# Patient Record
Sex: FEMALE | Race: WHITE | NOT HISPANIC OR LATINO | URBAN - METROPOLITAN AREA
[De-identification: names, ages, dates, MRNs, and addresses within clinical notes are randomized per-mention and may not be internally consistent; named-entity substitution may affect disease eponyms.]

---

## 2017-11-14 ENCOUNTER — OUTPATIENT (OUTPATIENT)
Dept: OUTPATIENT SERVICES | Facility: HOSPITAL | Age: 82
LOS: 1 days | Discharge: HOME | End: 2017-11-14
Payer: MEDICARE

## 2017-11-14 DIAGNOSIS — S42.201A UNSPECIFIED FRACTURE OF UPPER END OF RIGHT HUMERUS, INITIAL ENCOUNTER FOR CLOSED FRACTURE: ICD-10-CM

## 2017-11-14 PROCEDURE — 73030 X-RAY EXAM OF SHOULDER: CPT | Mod: 26,LT

## 2017-11-15 ENCOUNTER — OUTPATIENT (OUTPATIENT)
Dept: OUTPATIENT SERVICES | Facility: HOSPITAL | Age: 82
LOS: 1 days | Discharge: HOME | End: 2017-11-15
Payer: MEDICARE

## 2017-11-15 DIAGNOSIS — M05.711 RHEUMATOID ARTHRITIS WITH RHEUMATOID FACTOR OF RIGHT SHOULDER WITHOUT ORGAN OR SYSTEMS INVOLVEMENT: ICD-10-CM

## 2017-11-15 PROBLEM — Z00.00 ENCOUNTER FOR PREVENTIVE HEALTH EXAMINATION: Status: ACTIVE | Noted: 2017-11-15

## 2017-11-15 PROCEDURE — 73030 X-RAY EXAM OF SHOULDER: CPT | Mod: 26,RT

## 2017-11-18 ENCOUNTER — OUTPATIENT (OUTPATIENT)
Dept: OUTPATIENT SERVICES | Facility: HOSPITAL | Age: 82
LOS: 1 days | Discharge: HOME | End: 2017-11-18
Payer: MEDICARE

## 2017-11-18 DIAGNOSIS — K81.0 ACUTE CHOLECYSTITIS: ICD-10-CM

## 2017-11-18 PROCEDURE — 76700 US EXAM ABDOM COMPLETE: CPT | Mod: 26

## 2019-07-22 ENCOUNTER — INPATIENT (INPATIENT)
Facility: HOSPITAL | Age: 84
LOS: 3 days | Discharge: NURSING, REHAB OR LONG-TERM CARE | DRG: 178 | End: 2019-07-26
Attending: INTERNAL MEDICINE | Admitting: INTERNAL MEDICINE
Payer: MEDICARE

## 2019-07-22 VITALS
HEIGHT: 67 IN | DIASTOLIC BLOOD PRESSURE: 71 MMHG | HEART RATE: 83 BPM | WEIGHT: 145.06 LBS | RESPIRATION RATE: 20 BRPM | TEMPERATURE: 98 F | SYSTOLIC BLOOD PRESSURE: 131 MMHG | OXYGEN SATURATION: 96 %

## 2019-07-22 DIAGNOSIS — J18.1 LOBAR PNEUMONIA, UNSPECIFIED ORGANISM: ICD-10-CM

## 2019-07-22 LAB
ALBUMIN SERPL ELPH-MCNC: 3.1 G/DL — LOW (ref 3.3–5)
ALP SERPL-CCNC: 107 U/L — SIGNIFICANT CHANGE UP (ref 30–120)
ALT FLD-CCNC: 19 U/L DA — SIGNIFICANT CHANGE UP (ref 10–60)
ANION GAP SERPL CALC-SCNC: 8 MMOL/L — SIGNIFICANT CHANGE UP (ref 5–17)
APTT BLD: 37.3 SEC — HIGH (ref 28.5–37)
AST SERPL-CCNC: 23 U/L — SIGNIFICANT CHANGE UP (ref 10–40)
BASOPHILS # BLD AUTO: 0.04 K/UL — SIGNIFICANT CHANGE UP (ref 0–0.2)
BASOPHILS NFR BLD AUTO: 0.3 % — SIGNIFICANT CHANGE UP (ref 0–2)
BILIRUB SERPL-MCNC: 1 MG/DL — SIGNIFICANT CHANGE UP (ref 0.2–1.2)
BUN SERPL-MCNC: 20 MG/DL — SIGNIFICANT CHANGE UP (ref 7–23)
CALCIUM SERPL-MCNC: 8.8 MG/DL — SIGNIFICANT CHANGE UP (ref 8.4–10.5)
CHLORIDE SERPL-SCNC: 101 MMOL/L — SIGNIFICANT CHANGE UP (ref 96–108)
CO2 SERPL-SCNC: 27 MMOL/L — SIGNIFICANT CHANGE UP (ref 22–31)
CREAT SERPL-MCNC: 0.69 MG/DL — SIGNIFICANT CHANGE UP (ref 0.5–1.3)
DIGOXIN SERPL-MCNC: 0.5 NG/ML — LOW (ref 0.8–2)
EOSINOPHIL # BLD AUTO: 0.02 K/UL — SIGNIFICANT CHANGE UP (ref 0–0.5)
EOSINOPHIL NFR BLD AUTO: 0.2 % — SIGNIFICANT CHANGE UP (ref 0–6)
GLUCOSE SERPL-MCNC: 110 MG/DL — HIGH (ref 70–99)
HCT VFR BLD CALC: 32.1 % — LOW (ref 34.5–45)
HGB BLD-MCNC: 11.6 G/DL — SIGNIFICANT CHANGE UP (ref 11.5–15.5)
IMM GRANULOCYTES NFR BLD AUTO: 0.3 % — SIGNIFICANT CHANGE UP (ref 0–1.5)
INR BLD: 1.32 RATIO — HIGH (ref 0.88–1.16)
LACTATE SERPL-SCNC: 1.6 MMOL/L — SIGNIFICANT CHANGE UP (ref 0.7–2)
LYMPHOCYTES # BLD AUTO: 1.54 K/UL — SIGNIFICANT CHANGE UP (ref 1–3.3)
LYMPHOCYTES # BLD AUTO: 12.6 % — LOW (ref 13–44)
MCHC RBC-ENTMCNC: 34.6 PG — HIGH (ref 27–34)
MCHC RBC-ENTMCNC: 36.1 GM/DL — HIGH (ref 32–36)
MCV RBC AUTO: 95.8 FL — SIGNIFICANT CHANGE UP (ref 80–100)
MONOCYTES # BLD AUTO: 1.35 K/UL — HIGH (ref 0–0.9)
MONOCYTES NFR BLD AUTO: 11 % — SIGNIFICANT CHANGE UP (ref 2–14)
NEUTROPHILS # BLD AUTO: 9.24 K/UL — HIGH (ref 1.8–7.4)
NEUTROPHILS NFR BLD AUTO: 75.6 % — SIGNIFICANT CHANGE UP (ref 43–77)
NRBC # BLD: 0 /100 WBCS — SIGNIFICANT CHANGE UP (ref 0–0)
NT-PROBNP SERPL-SCNC: 2982 PG/ML — HIGH (ref 0–450)
PLATELET # BLD AUTO: 310 K/UL — SIGNIFICANT CHANGE UP (ref 150–400)
POTASSIUM SERPL-MCNC: 3.5 MMOL/L — SIGNIFICANT CHANGE UP (ref 3.5–5.3)
POTASSIUM SERPL-SCNC: 3.5 MMOL/L — SIGNIFICANT CHANGE UP (ref 3.5–5.3)
PROT SERPL-MCNC: 7.6 G/DL — SIGNIFICANT CHANGE UP (ref 6–8.3)
PROTHROM AB SERPL-ACNC: 14.5 SEC — HIGH (ref 10–12.9)
RBC # BLD: 3.35 M/UL — LOW (ref 3.8–5.2)
RBC # FLD: 17 % — HIGH (ref 10.3–14.5)
SODIUM SERPL-SCNC: 136 MMOL/L — SIGNIFICANT CHANGE UP (ref 135–145)
WBC # BLD: 12.23 K/UL — HIGH (ref 3.8–10.5)
WBC # FLD AUTO: 12.23 K/UL — HIGH (ref 3.8–10.5)

## 2019-07-22 PROCEDURE — 93010 ELECTROCARDIOGRAM REPORT: CPT

## 2019-07-22 PROCEDURE — 99285 EMERGENCY DEPT VISIT HI MDM: CPT

## 2019-07-22 RX ORDER — HEPARIN SODIUM 5000 [USP'U]/ML
5000 INJECTION INTRAVENOUS; SUBCUTANEOUS EVERY 12 HOURS
Refills: 0 | Status: DISCONTINUED | OUTPATIENT
Start: 2019-07-22 | End: 2019-07-26

## 2019-07-22 RX ORDER — ACETAMINOPHEN 500 MG
975 TABLET ORAL ONCE
Refills: 0 | Status: COMPLETED | OUTPATIENT
Start: 2019-07-22 | End: 2019-07-22

## 2019-07-22 RX ORDER — PIPERACILLIN AND TAZOBACTAM 4; .5 G/20ML; G/20ML
3.38 INJECTION, POWDER, LYOPHILIZED, FOR SOLUTION INTRAVENOUS EVERY 8 HOURS
Refills: 0 | Status: DISCONTINUED | OUTPATIENT
Start: 2019-07-22 | End: 2019-07-24

## 2019-07-22 RX ORDER — SODIUM CHLORIDE 9 MG/ML
1000 INJECTION INTRAMUSCULAR; INTRAVENOUS; SUBCUTANEOUS ONCE
Refills: 0 | Status: COMPLETED | OUTPATIENT
Start: 2019-07-22 | End: 2019-07-22

## 2019-07-22 RX ORDER — VANCOMYCIN HCL 1 G
1000 VIAL (EA) INTRAVENOUS ONCE
Refills: 0 | Status: COMPLETED | OUTPATIENT
Start: 2019-07-22 | End: 2019-07-22

## 2019-07-22 RX ORDER — PIPERACILLIN AND TAZOBACTAM 4; .5 G/20ML; G/20ML
3.38 INJECTION, POWDER, LYOPHILIZED, FOR SOLUTION INTRAVENOUS ONCE
Refills: 0 | Status: COMPLETED | OUTPATIENT
Start: 2019-07-22 | End: 2019-07-22

## 2019-07-22 RX ORDER — VANCOMYCIN HCL 1 G
750 VIAL (EA) INTRAVENOUS EVERY 12 HOURS
Refills: 0 | Status: DISCONTINUED | OUTPATIENT
Start: 2019-07-22 | End: 2019-07-24

## 2019-07-22 RX ADMIN — Medication 975 MILLIGRAM(S): at 20:05

## 2019-07-22 RX ADMIN — SODIUM CHLORIDE 1000 MILLILITER(S): 9 INJECTION INTRAMUSCULAR; INTRAVENOUS; SUBCUTANEOUS at 22:50

## 2019-07-22 RX ADMIN — SODIUM CHLORIDE 1000 MILLILITER(S): 9 INJECTION INTRAMUSCULAR; INTRAVENOUS; SUBCUTANEOUS at 20:18

## 2019-07-22 RX ADMIN — SODIUM CHLORIDE 1000 MILLILITER(S): 9 INJECTION INTRAMUSCULAR; INTRAVENOUS; SUBCUTANEOUS at 20:10

## 2019-07-22 RX ADMIN — Medication 975 MILLIGRAM(S): at 19:17

## 2019-07-22 RX ADMIN — SODIUM CHLORIDE 1000 MILLILITER(S): 9 INJECTION INTRAMUSCULAR; INTRAVENOUS; SUBCUTANEOUS at 19:19

## 2019-07-22 RX ADMIN — PIPERACILLIN AND TAZOBACTAM 200 GRAM(S): 4; .5 INJECTION, POWDER, LYOPHILIZED, FOR SOLUTION INTRAVENOUS at 20:10

## 2019-07-22 RX ADMIN — PIPERACILLIN AND TAZOBACTAM 3.38 GRAM(S): 4; .5 INJECTION, POWDER, LYOPHILIZED, FOR SOLUTION INTRAVENOUS at 21:11

## 2019-07-22 RX ADMIN — Medication 250 MILLIGRAM(S): at 21:13

## 2019-07-22 NOTE — ED ADULT NURSE NOTE - OBJECTIVE STATEMENT
sob x 1 day arrived to ed febrile  states that yesterday has felt increasing phlem in throat and chest cough non productive

## 2019-07-22 NOTE — ED ADULT NURSE REASSESSMENT NOTE - NS ED NURSE REASSESS COMMENT FT1
pt awake and alert respirations even and labored pt on cardiac monitor showing nsr. pt receiving o2 via nasal canula sating at 94

## 2019-07-22 NOTE — ED ADULT NURSE NOTE - NSIMPLEMENTINTERV_GEN_ALL_ED
Implemented All Fall with Harm Risk Interventions:  [place] to call system. Call bell, personal items and telephone within reach. Instruct patient to call for assistance. Room bathroom lighting operational. Non-slip footwear when patient is off stretcher. Physically safe environment: no spills, clutter or unnecessary equipment. Stretcher in lowest position, wheels locked, appropriate side rails in place. Provide visual cue, wrist band, yellow gown, etc. Monitor gait and stability. Monitor for mental status changes and reorient to person, place, and time. Review medications for side effects contributing to fall risk. Reinforce activity limits and safety measures with patient and family. Provide visual clues: red socks.

## 2019-07-22 NOTE — ED PROVIDER NOTE - OBJECTIVE STATEMENT
89 year old female with history of A-fib, HTN, HLD, and CVA presents with weakness the past 2-3 days with labored breathing starting today. Son noticed patient appeared more weak than usual 2 days ago and "didn't walk as much" during visit. other son noticed patient in bed more yesterday as well during his visit. patient reports body aches and increased weakness today. staff noticed labored breathing today. denies abd pain or urinary symptoms. no vomiting or diarrhea.     Resident at [facility]  PCP Ekaterina Nicole

## 2019-07-22 NOTE — CONSULT NOTE ADULT - ASSESSMENT
PATIENT IVAN RUFF [facility]  [phone]  [address] DOA 2019 DR SUPRIYA BREWER                                                Patient description                          89 f PMH A fib htn hld cva was admitted [facility] 2019 with weakness x 2 d labored breathing     Home meds asa 81 dig 125 lipitor 20 norvasc 10 atenolol 50 atenolol 50 enalapril 2.5     vitals afeb 83 130/70                                            er meds vanco zosyn        PATIENT IVAN RUFF [facility]  [phone]  19[address] DOA 2019 DR SUPRIYA BREWER                                                PROBLEM/ASSESSMENT/RECOMMENDATIONS (A/R)       FEVER   2019 101f   2019 W 12.2  PNEUMONIA   2019 cxr l basal infiltr effsn   2019 cta ch   basal atelectasis v infiltr  er meds vanco zosyn   A/R 2019 Cont vanco zosyn   DYSPNEA  A/R 2019 monitor po target po 90-95%  RO PE   2019 cta ch n   A/R PE unlikely   PLEURAL EFFSN   2019 cta ch   Mod l pl effs   A/R 2019 Will dw fam and plan thorac   2019 left message for son   PERICARDIAL EFFSN   2019 cta ch   mod pericard effs  A/R 2019 check echo clinically not in tamponade   CHF    2019 bnp 2982   A fib  A/R 2019 Cont meds       TIME SPENT Over 55 minutes aggregate care time spent on encounter; activities included   direct pt care, counseling and/or coordinating care reviewing notes, lab data/ imaging , discussion with multidisciplinary team/ pt /family. Risks, benefits, alternatives  discussed in detail.      NOTE ABOVE NOTE IS PRELIMINARY NOTE PT TO BE SEEN WITHIN THE HOUR

## 2019-07-22 NOTE — ED PROVIDER NOTE - CLINICAL SUMMARY MEDICAL DECISION MAKING FREE TEXT BOX
weakness past 2-3 days. labored breasting today. febrile in triage. will order blood cultures, labs, lactate, UA, CXR, trop, EKG. will start IVF

## 2019-07-22 NOTE — ED PROVIDER NOTE - PROGRESS NOTE DETAILS
resting comfortably. no shortness of breath. no hypoxia. Abx infused. spoke with Dr. Pierre, kindly accepts patient for admission

## 2019-07-22 NOTE — ED PROVIDER NOTE - ATTENDING CONTRIBUTION TO CARE
89 y.o. F BIBEMS for weakness - pt and family report that she became weaker over the weekend, lives in an assisted living, today note SOB, but no cough, pt c/o body aches, no other complaints, no cp, no abd pain, no n/v/d, no known sick contacts, yesterday slid down off chair, didn't get hurt but needed 2 aides to get back to bed b/c of weakness; on exam pt is wd, wn, appears mildly sob; heent - perrl, mmm; chest - tachypneic, speaks in short sentences, no wheeze, no rhonchi; abd - soft, nt, nd, nabs; extr - no edema, normal tone; skin - pale, warm; neuro - alert, follows commands, interactive, moves all extremities equally; psych - calm, cooperative; A/P - 89 y.o. F with SOB and fever - sepsis possibly from pna, cxr, fluids, abx, lactate, cxs, admit

## 2019-07-23 ENCOUNTER — TRANSCRIPTION ENCOUNTER (OUTPATIENT)
Age: 84
End: 2019-07-23

## 2019-07-23 DIAGNOSIS — E78.00 PURE HYPERCHOLESTEROLEMIA, UNSPECIFIED: ICD-10-CM

## 2019-07-23 DIAGNOSIS — I31.3 PERICARDIAL EFFUSION (NONINFLAMMATORY): ICD-10-CM

## 2019-07-23 DIAGNOSIS — I48.91 UNSPECIFIED ATRIAL FIBRILLATION: ICD-10-CM

## 2019-07-23 DIAGNOSIS — R13.10 DYSPHAGIA, UNSPECIFIED: ICD-10-CM

## 2019-07-23 DIAGNOSIS — J18.1 LOBAR PNEUMONIA, UNSPECIFIED ORGANISM: ICD-10-CM

## 2019-07-23 DIAGNOSIS — J90 PLEURAL EFFUSION, NOT ELSEWHERE CLASSIFIED: ICD-10-CM

## 2019-07-23 DIAGNOSIS — I10 ESSENTIAL (PRIMARY) HYPERTENSION: ICD-10-CM

## 2019-07-23 DIAGNOSIS — Z29.9 ENCOUNTER FOR PROPHYLACTIC MEASURES, UNSPECIFIED: ICD-10-CM

## 2019-07-23 LAB
ALBUMIN SERPL ELPH-MCNC: 2.6 G/DL — LOW (ref 3.3–5)
ALP SERPL-CCNC: 101 U/L — SIGNIFICANT CHANGE UP (ref 30–120)
ALT FLD-CCNC: 16 U/L DA — SIGNIFICANT CHANGE UP (ref 10–60)
ANION GAP SERPL CALC-SCNC: 7 MMOL/L — SIGNIFICANT CHANGE UP (ref 5–17)
APPEARANCE UR: ABNORMAL
AST SERPL-CCNC: 23 U/L — SIGNIFICANT CHANGE UP (ref 10–40)
BILIRUB SERPL-MCNC: 0.7 MG/DL — SIGNIFICANT CHANGE UP (ref 0.2–1.2)
BILIRUB UR-MCNC: NEGATIVE — SIGNIFICANT CHANGE UP
BUN SERPL-MCNC: 15 MG/DL — SIGNIFICANT CHANGE UP (ref 7–23)
CALCIUM SERPL-MCNC: 8.5 MG/DL — SIGNIFICANT CHANGE UP (ref 8.4–10.5)
CHLORIDE SERPL-SCNC: 105 MMOL/L — SIGNIFICANT CHANGE UP (ref 96–108)
CO2 SERPL-SCNC: 26 MMOL/L — SIGNIFICANT CHANGE UP (ref 22–31)
COLOR SPEC: YELLOW — SIGNIFICANT CHANGE UP
CREAT SERPL-MCNC: 0.49 MG/DL — LOW (ref 0.5–1.3)
DIFF PNL FLD: ABNORMAL
GLUCOSE SERPL-MCNC: 98 MG/DL — SIGNIFICANT CHANGE UP (ref 70–99)
GLUCOSE UR QL: NEGATIVE MG/DL — SIGNIFICANT CHANGE UP
HCT VFR BLD CALC: 31.4 % — LOW (ref 34.5–45)
HGB BLD-MCNC: 11.2 G/DL — LOW (ref 11.5–15.5)
INR BLD: 1.2 RATIO — HIGH (ref 0.88–1.16)
KETONES UR-MCNC: NEGATIVE — SIGNIFICANT CHANGE UP
LDH SERPL L TO P-CCNC: 239 U/L — SIGNIFICANT CHANGE UP (ref 50–242)
LEUKOCYTE ESTERASE UR-ACNC: ABNORMAL
MCHC RBC-ENTMCNC: 34.6 PG — HIGH (ref 27–34)
MCHC RBC-ENTMCNC: 35.7 GM/DL — SIGNIFICANT CHANGE UP (ref 32–36)
MCV RBC AUTO: 96.9 FL — SIGNIFICANT CHANGE UP (ref 80–100)
NITRITE UR-MCNC: NEGATIVE — SIGNIFICANT CHANGE UP
NRBC # BLD: 0 /100 WBCS — SIGNIFICANT CHANGE UP (ref 0–0)
PH UR: 6 — SIGNIFICANT CHANGE UP (ref 5–8)
PHOSPHATE SERPL-MCNC: 2.5 MG/DL — SIGNIFICANT CHANGE UP (ref 2.5–4.5)
PLATELET # BLD AUTO: 274 K/UL — SIGNIFICANT CHANGE UP (ref 150–400)
POTASSIUM SERPL-MCNC: 3.7 MMOL/L — SIGNIFICANT CHANGE UP (ref 3.5–5.3)
POTASSIUM SERPL-SCNC: 3.7 MMOL/L — SIGNIFICANT CHANGE UP (ref 3.5–5.3)
PROCALCITONIN SERPL-MCNC: 0.15 NG/ML — HIGH (ref 0.02–0.1)
PROCALCITONIN SERPL-MCNC: 0.2 NG/ML — HIGH (ref 0.02–0.1)
PROT SERPL-MCNC: 6.9 G/DL — SIGNIFICANT CHANGE UP (ref 6–8.3)
PROT UR-MCNC: 100 MG/DL
PROTHROM AB SERPL-ACNC: 13.1 SEC — HIGH (ref 10–12.9)
RBC # BLD: 3.24 M/UL — LOW (ref 3.8–5.2)
RBC # FLD: 15.2 % — HIGH (ref 10.3–14.5)
SODIUM SERPL-SCNC: 138 MMOL/L — SIGNIFICANT CHANGE UP (ref 135–145)
SP GR SPEC: 1.01 — SIGNIFICANT CHANGE UP (ref 1.01–1.02)
TROPONIN I SERPL-MCNC: 0.01 NG/ML — LOW (ref 0.02–0.06)
TROPONIN I SERPL-MCNC: 0.01 NG/ML — LOW (ref 0.02–0.06)
TSH SERPL-MCNC: 2.34 UIU/ML — SIGNIFICANT CHANGE UP (ref 0.27–4.2)
UROBILINOGEN FLD QL: 4 MG/DL
WBC # BLD: 8.9 K/UL — SIGNIFICANT CHANGE UP (ref 3.8–10.5)
WBC # FLD AUTO: 8.9 K/UL — SIGNIFICANT CHANGE UP (ref 3.8–10.5)

## 2019-07-23 PROCEDURE — 76604 US EXAM CHEST: CPT | Mod: 26

## 2019-07-23 PROCEDURE — 93306 TTE W/DOPPLER COMPLETE: CPT | Mod: 26

## 2019-07-23 RX ORDER — FAMOTIDINE 10 MG/ML
20 INJECTION INTRAVENOUS DAILY
Refills: 0 | Status: DISCONTINUED | OUTPATIENT
Start: 2019-07-23 | End: 2019-07-26

## 2019-07-23 RX ORDER — LACTOBACILLUS ACIDOPHILUS 100MM CELL
1 CAPSULE ORAL EVERY 8 HOURS
Refills: 0 | Status: DISCONTINUED | OUTPATIENT
Start: 2019-07-23 | End: 2019-07-26

## 2019-07-23 RX ORDER — ASPIRIN/CALCIUM CARB/MAGNESIUM 324 MG
81 TABLET ORAL DAILY
Refills: 0 | Status: DISCONTINUED | OUTPATIENT
Start: 2019-07-23 | End: 2019-07-26

## 2019-07-23 RX ORDER — FUROSEMIDE 40 MG
20 TABLET ORAL DAILY
Refills: 0 | Status: DISCONTINUED | OUTPATIENT
Start: 2019-07-23 | End: 2019-07-26

## 2019-07-23 RX ORDER — DIGOXIN 250 MCG
0.12 TABLET ORAL DAILY
Refills: 0 | Status: DISCONTINUED | OUTPATIENT
Start: 2019-07-23 | End: 2019-07-26

## 2019-07-23 RX ORDER — ACETAMINOPHEN 500 MG
650 TABLET ORAL EVERY 6 HOURS
Refills: 0 | Status: DISCONTINUED | OUTPATIENT
Start: 2019-07-23 | End: 2019-07-26

## 2019-07-23 RX ORDER — ATORVASTATIN CALCIUM 80 MG/1
20 TABLET, FILM COATED ORAL AT BEDTIME
Refills: 0 | Status: DISCONTINUED | OUTPATIENT
Start: 2019-07-23 | End: 2019-07-26

## 2019-07-23 RX ORDER — DOCUSATE SODIUM 100 MG
100 CAPSULE ORAL
Refills: 0 | Status: DISCONTINUED | OUTPATIENT
Start: 2019-07-23 | End: 2019-07-26

## 2019-07-23 RX ADMIN — Medication 1 TABLET(S): at 21:40

## 2019-07-23 RX ADMIN — Medication 0.12 MILLIGRAM(S): at 11:26

## 2019-07-23 RX ADMIN — Medication 250 MILLIGRAM(S): at 20:45

## 2019-07-23 RX ADMIN — PIPERACILLIN AND TAZOBACTAM 25 GRAM(S): 4; .5 INJECTION, POWDER, LYOPHILIZED, FOR SOLUTION INTRAVENOUS at 05:34

## 2019-07-23 RX ADMIN — Medication 2.5 MILLIGRAM(S): at 11:26

## 2019-07-23 RX ADMIN — ATORVASTATIN CALCIUM 20 MILLIGRAM(S): 80 TABLET, FILM COATED ORAL at 21:40

## 2019-07-23 RX ADMIN — PIPERACILLIN AND TAZOBACTAM 25 GRAM(S): 4; .5 INJECTION, POWDER, LYOPHILIZED, FOR SOLUTION INTRAVENOUS at 13:29

## 2019-07-23 RX ADMIN — Medication 1 TABLET(S): at 13:31

## 2019-07-23 RX ADMIN — Medication 250 MILLIGRAM(S): at 10:00

## 2019-07-23 RX ADMIN — FAMOTIDINE 20 MILLIGRAM(S): 10 INJECTION INTRAVENOUS at 11:26

## 2019-07-23 RX ADMIN — Medication 20 MILLIGRAM(S): at 10:07

## 2019-07-23 RX ADMIN — HEPARIN SODIUM 5000 UNIT(S): 5000 INJECTION INTRAVENOUS; SUBCUTANEOUS at 06:22

## 2019-07-23 RX ADMIN — Medication 650 MILLIGRAM(S): at 12:11

## 2019-07-23 RX ADMIN — Medication 650 MILLIGRAM(S): at 13:00

## 2019-07-23 RX ADMIN — PIPERACILLIN AND TAZOBACTAM 25 GRAM(S): 4; .5 INJECTION, POWDER, LYOPHILIZED, FOR SOLUTION INTRAVENOUS at 21:40

## 2019-07-23 NOTE — SWALLOW BEDSIDE ASSESSMENT ADULT - SWALLOW EVAL: DIAGNOSIS
1- functional oral management given puree, honey thick and nectar thick liquid textures marked by adequate bolus collection, transfer and transport. 2- mild oral dysphagia given solids marked by prolonged mastication resulting in delayed oral preparation/AP transit. trace lingual residue remained post swallow which pt is able to reduce given liquid wash. 3- mild oral dysphagia given thin liquids marked by suspected posterior loss over base of tongue. 4- mild pharyngeal dysphagia given solid, puree, honey thick and nectar thick liquids marked by delayed swallow trigger and reduced hyolaryngeal excursion without any overt s/s of penetration/aspiration. 5- moderate-severe pharyngeal dysphagia given thin liquids marked by delayed swallow trigger and reduced hyolaryngeal excursion resulting in immediate reflexive coughing and increased work of breathing noted post PO suggestive of penetration/aspiration.

## 2019-07-23 NOTE — DIETITIAN INITIAL EVALUATION ADULT. - PERTINENT LABORATORY DATA
GASTROENTEROLOGY CLINIC NOTE    Patient: Stefanie Onofre Date: 8/13/2018   YOB: 1965 Primary Care Physician: Porsha Jackson MD   53 year old female        REASON FOR VISIT:  Chronic HCV    The patient is a 53 year old female who is being seen by Gastroenterology at the request of Dr. Jackson for chronic HCV.    Patient was noted have HCV in 2004 in [place] but again was told that did not have HCV in 2015. Patient states that she was told that HCV was positive again later. Patient feels that she is getting conflicting information and would like to be retested again. Patient denies any history of IV drug abuse, blood transfusions, sharing needles of razors, multiple sexual partners.    Patient has no new concerns today. Patient denies any dysphagia, odynophagia, fatigue, jaundice, episodes of confusion, abdominal pain/distention, lower extremity edema, skin rashes, hematemesis, melena, hematochezia or any other concerns. Patient has chronic GERD and takes omeprazole 40 mg p.o. Daily.    Patient is extremely tearful in the office today regarding her 's health.    Patient has chronic constipation and takes miralax and Amitiza. Patient has 1 to 3 stools per day and the stools are occasionally hard. Patient takes narcotics for chronic back pain.     PAST MEDICAL HISTORY:    Depressive disorder                                           Anxiety                                                       COPD (chronic obstructive pulmonary disease) (*               Glaucoma (increased eye pressure)                             Lower back pain                                               Hepatitis C, chronic (CMS/HCC)                  2004          Ovarian cyst                                                    Comment: Right oophorectomy in 2000 due to enlarged                right ovarian cyst    STD (sexually transmitted disease)                              Comment: Gonorrhea, chlamydia    Abnormal Pap  smear of cervix                                    Comment: Years ago; pt unsure regarding this history; pt               unsure if she underwent colposcopy; she denies                cervical surgery; pt states that she has had                normal pap smears since    Fibroid tumor                                                 Chronic pain                                                  Arthritis                                                     PAST SURGICAL HISTORY:    OOPHORECTOMY                                                    Comment: right side    CYST REMOVAL                                                    Comment: left hand    ESOPHAGOGASTRODUODENOSCOPY TRANSORAL FLEX DIAG  6/1/15          Comment: Constipation, No Meier's    COLONOSCOPY DIAGNOSTIC                          6/1/15          Comment: Affi 5yr recall, 1 polyp tubular adenoma    TUBAL LIGATION                                                  Comment: Performed 26 years ago    ANES EXC SKIN HIDRADENITIS AX; SIMPL/INT                      Current Outpatient Prescriptions   Medication Sig   • fluticasone-salmeterol (ADVAIR DISKUS) 250-50 MCG/DOSE inhaler Inhale 1 puff into the lungs two times daily.   • ARIPiprazole (ABILIFY) 5 MG tablet Take 1 tablet by mouth daily.   • montelukast (SINGULAIR) 10 MG tablet Take 1 tablet by mouth nightly.   • fluticasone (FLONASE) 50 MCG/ACT nasal spray Spray 2 sprays in each nostril daily.   • cyclobenzaprine (FLEXERIL) 10 MG tablet Take 0.5 tablets by mouth 3 times daily as needed for Muscle spasms.   • clindamycin (CLINDAGEL) 1 % gel Apply topically 2 times daily.   • clotrimazole 1 % vaginal cream Insert one applicator full of cream vaginally once daily for 7 days.   • AMITIZA 24 MCG capsule TRIAL 24MCG CAP ONCE DAILY IN AM. IF STILL CONSTIPATED IN ONE WEEK INCREASE TO 24MCG CAP TWICE DAILY   • SUMAtriptan (IMITREX) 25 MG tablet Take 1 tablet by mouth at onset of migraine. May repeat after 2 hours  if needed.   • LORazepam (ATIVAN) 1 MG tablet Take 1 tablet by mouth daily as needed for Anxiety.   • hydrOXYzine (ATARAX) 25 MG tablet TAKE 1 TABLET BY MOUTH NIGHTLY.   • albuterol (PROVENTIL HFA) 108 (90 Base) MCG/ACT inhaler Inhale 2 puffs into the lungs every 4 hours as needed for Shortness of Breath or Wheezing.   • cetirizine (ZYRTEC) 10 MG tablet Take 1 tablet by mouth daily.   • citalopram (CELEXA) 40 MG tablet Take 1 tablet by mouth daily.   • magnesium oxide (MAG-OX) 400 MG tablet Take 1 tablet by mouth daily.   • omeprazole (PRILOSEC) 40 MG capsule Take 1 capsule by mouth daily. Take 30 minutes before evening meal.   • polyethylene glycol (GLYCOLAX, MIRALAX) packet Take 17 g by mouth 2 times daily.   • tiotropium (SPIRIVA HANDIHALER) 18 MCG capsule for inhaler Place 1 capsule into inhaler and inhale daily.   • gabapentin (NEURONTIN) 400 MG capsule Take 1 capsule by mouth 4 times daily.   • vitamin D, Cholecalciferol, 1000 units capsule Take 1 capsule by mouth daily.   • oxycodone (OXY-IR) 5 MG capsule Take 1 capsule by mouth 3 times daily as needed for Pain.     No current facility-administered medications for this visit.        ALLERGIES:  No Known Allergies     Family History   Problem Relation Age of Onset   • Tuberculosis Mother    • Diabetes Sister    • Hypertension Sister    • Diabetes Sister    • Diabetes Sister        Social History     Social History   • Marital status:      Spouse name: N/A   • Number of children: N/A   • Years of education: N/A     Occupational History   • Not on file.     Social History Main Topics   • Smoking status: Current Some Day Smoker     Packs/day: 0.10     Years: 20.00     Types: Cigarettes     Start date: 12/30/1995   • Smokeless tobacco: Never Used      Comment: about 2-3 cigarettes per day   • Alcohol use No   • Drug use: No   • Sexual activity: No     Other Topics Concern   • Not on file     Social History Narrative   • No narrative on file          REVIEW  OF SYSTEMS:  A complete review of systems was performed and found to be negative except for what was stated in the HPI (History of Present Illness).    PHYSICAL EXAM:  Visit Vitals  /73   Pulse 99   Temp 95.8 °F (35.4 °C) (Temporal Artery)   Ht 5' 2\" (1.575 m)   Wt 80.7 kg   SpO2 97%   BMI 32.56 kg/m²      General: Patient appears alert and oriented  Head: Atraumatic, normocephalic  Eyes: No Scleral Icterus  Skin: Exposed areas appear grossly normal. No jaundice  Cardiovascular: RRR (regular rate and rhythm), no murmur  Lungs: Normal breath sounds bilaterally  Abdomen: Soft, nondistended, no tenderness to palpation, normal bowel sounds   Extremities: No pedal edema or rash or ulcers  Neurologic: No asterixis    Lab Results   Component Value Date    SODIUM 139 10/04/2017    POTASSIUM 3.9 10/04/2017    CHLORIDE 105 10/04/2017    CO2 24 10/04/2017    GLUCOSE 96 10/04/2017    BUN 4 (L) 10/04/2017    CREATININE 0.75 10/04/2017    ALBUMIN 3.8 11/14/2016    BILIRUBIN 0.3 11/14/2016    AST 17 11/14/2016     Lab Results   Component Value Date    WBC 9.1 10/04/2017    HGB 13.7 10/04/2017    HCT 41.1 10/04/2017     10/04/2017    CRP 2.3 (H) 06/23/2017    TSH 1.448 02/01/2018       ASSESSMENT:   Patient is a 53-year-old female who is presenting for follow-up of chronic HCV.    1. Chronic HCV:  - HCV RNA Quant tested in the system in 2015 was negative. However patient insists that she was tested again was noted to be positive.  - No other symptoms at this time.    2. Chronic constipation: Secondary to chronic narcotic use.  - On MiraLAX and Amitiza.  - Colonoscopy 2015: 6-7 mm polyp in the ascending colon (tubular adenoma), internal hemorrhoids, left colon diverticulosis.    3. Chronic GERD:  - Symptoms controlled on omeprazole 40 mg po daily.    PLAN/RECOMMENDATIONS:  - Will obtain repeat HCV testing per patient's request.  - Continue MiraLAX and hematochezia for constipation.  - Encouraged to maintain adequate  fiber intake and hydration.  - Further recommendations upon results of testing.    Patient discussed with Dr. Hermosillo.     Melania Mccracken MD  Gastroenterology Fellow  Pager: [phone]  8/13/2018   (7/23) Hgb 11.2 L, Hct 31.4 L, Na 138 wnl, K 3.7 wnl, Cl 105 wnl, CO2 26 wnl, BUN 15 wnl, Creat 0.49 L, Glu 98 wnl, eGFR 83 wnl

## 2019-07-23 NOTE — H&P ADULT - NSHPLABSRESULTS_GEN_ALL_CORE
< from: CT Angio Chest w/ IV Cont (07.22.19 @ 20:46) >    EXAM:  CT ANGIO CHEST (W)AW IC                                  PROCEDURE DATE:  07/22/2019          INTERPRETATION:  CLINICAL STATEMENT: SOB    TECHNIQUE: CTA of the chest was performed with IV contrast.    Approximately 84 cc of Omnipaque 350 administered and 16 cc was   discarded. MIP images were provided.    COMPARISON: None.    FINDINGS:    There is no filling defect within the main, right interlobar, left   descending and visualized proximal segmental pulmonary arteries.    There is no thoracic aortic dissection or aneurysm.    There are mildly enlarged mediastinal lymph nodes measuring up to 1.0 cm.    There is a moderate pericardial effusion.  There is a small free-flowing   right pleural effusion. There is a small to moderate left pleural   effusion..     The heart size is within normal limits.  The lungs demonstrate bibasilar   linear atelectasis versus infiltrate.    The upper abdomen is remarkable for left renal cysts and intrarenal   calculi in the left kidney.    The osseous structures demonstrate osteopenia and degenerative change of   the spine.    IMPRESSION:    No evidence of pulmonary embolus .  Moderate pericardial effusion  Bilateral pleural effusions left greater than right with associated   bibasilar atelectasis versus infiltrate  Mild nonspecific mediastinal lymphadenopathy    < end of copied text >    < from: Xray Chest 1 View-PORTABLE IMMEDIATE (07.22.19 @ 19:18) >    EXAM:  XR CHEST PORTABLE IMMED 1V                                  PROCEDURE DATE:  07/22/2019          INTERPRETATION:  History: Sepsis    Portable radiograph of the chest is performed. There are no prior studies   for comparison.    The heart appears enlarged. The trachea is midline. The right lung is   clear. There is density at the left base suggesting infiltrate and/or   pleural effusion. There is osteopenia and degenerative changes of the   bony structures.    Impression: Left basilar infiltrate/effusion    < end of copied text >

## 2019-07-23 NOTE — H&P ADULT - PROBLEM SELECTOR PLAN 3
continue home medications ,card consult called Admitted  to telemetry unit for monitoring , send 3 sets of cardiac ensymes to rule out coronary event, obtain ECHO to evaluate LVEF, cardiology consult ,continue current management, O2 supply, anticoagulation plan as per cardiology consult Thoracocentesis as per Dr Tena order Admit to monitored unit for cardiac monitoring, obtain echo to evaluate LVEF, diuresis, monitor ins/outs, monitor renal profile and electrolytes closely, cardiology consult ,send 3 sets of ensymes, O2 supply, serial chest xrays, monitor weights and oral intake of fluids, nutritionist consult

## 2019-07-23 NOTE — H&P ADULT - PROBLEM SELECTOR PLAN 4
continue current management and present  medications ,tele monitoring aspiration precautions ,speech and swallow eval , MBS

## 2019-07-23 NOTE — DIETITIAN INITIAL EVALUATION ADULT. - OTHER INFO
89 year old female, [facility] resident (from [facility]) with hx of A-fib, HTN, HLD, and CVA presents with weakness the past 2-3 days associated with onset SOB. Chest xray showed LLL pneumonia Admitted for septic workup and evaluation, iv abx initiated -started on zosyn and vancomycin. Found to have pericardial effusion; cardiology evaluation, palliative care consult requested per MD. Pt on regular diet c thins PTA; evaluated by SLP w/ recommendations of mechanical soft c nectar consistency fluids. Tolerating cardiac diet w/ fair-good appetite per pt report; consumed oatmeal & coffee this AM. Pt admitted yesterday weighing 145 lbs (7/22); weighed obtained today (7/23) was 170 lbs per bedscale (Pt appears closer to admission weight);  per chart review/documents from [facility] pt weighed 158 lbs 1/5/19. Nutrition-focused physical exam performed; noted w/ moderate muscle wasting @ calves; however pt reports that she doesn't walk as much as she used to d/t arthritis. Pt would benefit from oral supplement d/t illness in advanced age increasing nutrient needs and suspected weight loss x 6 months. Will provide Magic Cup 4 oz. BID (provides 290 kcal, 9 g protein/4 oz. serving). Will monitor tolerance of current diet (food/fluid consistency changed this AM) and encourage PO intake. Skin intact of PU; no edema per flowsheets. No c/o n/v/d/c; last BM 7/22.

## 2019-07-23 NOTE — DIETITIAN INITIAL EVALUATION ADULT. - PHYSICAL APPEARANCE
other (specify)/underweight/BMI 22.7 underweight for advanced age Nutrition-focused physical exam performed; noted w/ moderate muscle wasting @ calves.  No edema per flowsheets.  Skin intact of PU; Los Score 14 per flowsheets.

## 2019-07-23 NOTE — CONSULT NOTE ADULT - ASSESSMENT
The patient is a 89 year old female with a history of HTN, HL, CVA, atrial fibrillation who is admitted with pleural effusions, pericardial effusion, acute on chronic diastolic heart failure, possible PNA.    Plan:  - Received IV fluids yesterday for fever and concern for sepsis  - Would hold off giving additional IV fluids  - Start low dose diuretic at furosemide 20 mg PO daily  - BNP mildly elevated at 2982  - An acute MI is ruled out with one set of cardiac enzymes  - CT chest with small/moderate bilateral pleural effusions, moderate pericardial effusion  - Echocardiogram with normal/hyperdynamic LV systolic function, moderate pericardial effusion. Incomplete study to evaluate for increased intrapericardial pressures but no obvious chamber collapse.  - Restart digoxin 0.125 mg daily  - Clarify why patient is not on anticoagulation for AF (prior falls or bleed?). For now, continue aspirin.

## 2019-07-23 NOTE — SWALLOW BEDSIDE ASSESSMENT ADULT - ASR SWALLOW ASPIRATION MONITOR
gurgly voice/pneumonia/upper respiratory infection/position upright (90Y)/cough/throat clearing/oral hygiene/fever/change of breathing pattern

## 2019-07-23 NOTE — DISCHARGE NOTE NURSING/CASE MANAGEMENT/SOCIAL WORK - NSDCPEPTSTRK_GEN_ALL_CORE
Need for follow up after discharge/Prescribed medications/Risk factors for stroke/Call 911 for stroke/Stroke support groups for patients, families, and friends/Stroke warning signs and symptoms/Signs and symptoms of stroke/Stroke education booklet

## 2019-07-23 NOTE — DIETITIAN INITIAL EVALUATION ADULT. - ADD RECOMMEND
Will provide 4 oz. Magic Cup BID (provides 290 kcal, 9 g protein/4 oz. to meet increased nutrient needs d/t PNA in advanced age).

## 2019-07-23 NOTE — H&P ADULT - PROBLEM SELECTOR PLAN 6
Gastrointestinal stress ulcer prophylaxis and DVT prophylaxis administrated continue current management and present  medications ,tele monitoring

## 2019-07-23 NOTE — H&P ADULT - PROBLEM SELECTOR PLAN 1
Admitted for septic workup and evaluation,send blood and urine cx,serial lactate levels,monitor vitals closley,ivfs hydration,monitor urine output and renal profile,iv abx initiated -on vanco and zosyn ,seen by pulm consult

## 2019-07-23 NOTE — H&P ADULT - ASSESSMENT
89 year old female ,[facility] resident  with hx  of A-fib, HTN, HLD, and CVA presents with weakness the past 2-3 days associated  with onset SOB . Son noticed patient appeared more weak and fatigued than usual 2-3  days ago and she  "didn't walk as much" . Patient reports generalized body aches and increased weakness . Medical staff  of [facility] noticed labored breathing and sent patient to ER for evaluation .on arrival to ER she was HD stable , had SOB ,  denied CP , abd pain or urinary symptoms. no vomiting or diarrhea. Chest xray showed LLL pneumonia Admitted for septic workup and evaluation, send blood and urine cx, serial lactate levels, monitor vitals closely hydration, monitor urine output and renal profile, iv abx initiated -started on zosyn and vancomycin .Seen by pulmonologist Dr Tena . CTA performed -no PE , found to have pericardial effusion and cardiology evaluation requested Admitted  to telemetry unit for monitoring , send 3 sets of cardiac ensymes to rule out coronary event, obtain ECHO to evaluate LVEF, cardiology consult ,continue current management, O2 supply, anticoagulation plan as per cardiology consult Palliative care consult requested ,to discuss advance directives and complete MOLST

## 2019-07-23 NOTE — PHYSICAL THERAPY INITIAL EVALUATION ADULT - ADDITIONAL COMMENTS
Pt from [facility] . Pt states she occasionally ambulates with RW with assist or uses a wheelchair. Needs assistance with ADLs. Pt from [facility] . As per  notes, pt uses a wheelchair and requires assistance and use of AD from staff at facility. Needs assistance with ADLs.

## 2019-07-23 NOTE — H&P ADULT - PROBLEM SELECTOR PLAN 5
continue home medications continue home medications ,card consult called Admitted  to telemetry unit for monitoring , send 3 sets of cardiac ensymes to rule out coronary event, obtain ECHO to evaluate LVEF, cardiology consult ,continue current management, O2 supply, anticoagulation plan as per cardiology consult

## 2019-07-23 NOTE — H&P ADULT - NSICDXPASTMEDICALHX_GEN_ALL_CORE_FT
PAST MEDICAL HISTORY:  Atrial fibrillation     Cerebrovascular accident (CVA)     High cholesterol     Hypertension

## 2019-07-23 NOTE — H&P ADULT - GASTROINTESTINAL DETAILS
nontender/soft/no rebound tenderness/normal/no bruit/bowel sounds normal/no masses palpable/no distention

## 2019-07-23 NOTE — PHYSICAL THERAPY INITIAL EVALUATION ADULT - CRITERIA FOR SKILLED THERAPEUTIC INTERVENTIONS
functional limitations in following categories/decreased strength, decreased balance, decreased cognition/impairments found

## 2019-07-23 NOTE — DISCHARGE NOTE NURSING/CASE MANAGEMENT/SOCIAL WORK - NSDCDPATPORTLINK_GEN_ALL_CORE
You can access the [facility] Portal, offered by [facility], by registering with the following website: [email or web address]

## 2019-07-23 NOTE — SWALLOW BEDSIDE ASSESSMENT ADULT - ORAL PHASE
Within functional limits Decreased anterior-posterior movement of the bolus/Lingual stasis/Delayed oral transit time/stasis reduces with liquid wash suspected posterior loss over base of tongue/Decreased anterior-posterior movement of the bolus/Delayed oral transit time

## 2019-07-23 NOTE — DIETITIAN INITIAL EVALUATION ADULT. - PROBLEM SELECTOR PLAN 5
continue home medications ,card consult called Admitted  to telemetry unit for monitoring , send 3 sets of cardiac ensymes to rule out coronary event, obtain ECHO to evaluate LVEF, cardiology consult ,continue current management, O2 supply, anticoagulation plan as per cardiology consult 08-Oct-2018 16:44

## 2019-07-23 NOTE — SWALLOW BEDSIDE ASSESSMENT ADULT - PHARYNGEAL PHASE
Decreased laryngeal elevation/Delayed pharyngeal swallow Delayed pharyngeal swallow/Decreased laryngeal elevation increased work of breathing noted post PO/Delayed pharyngeal swallow/Throat clear post oral intake/Cough post oral intake/Decreased laryngeal elevation

## 2019-07-23 NOTE — H&P ADULT - HISTORY OF PRESENT ILLNESS
89 year old female ,[facility] resident  with hx  of A-fib, HTN, HLD, and CVA presents with weakness the past 2-3 days associated  with onset SOB . Son noticed patient appeared more weak and fatigued than usual 2-3  days ago and she  "didn't walk as much" . Patient reports generalized body aches and increased weakness . Medical staff  of [facility] noticed labored breathing and sent patient to ER for evaluation .on arrival to ER she was HD stable , had SOB ,  denied CP , abd pain or urinary symptoms. no vomiting or diarrhea. Chest xray showed LLL pneumonia Admitted for septic workup and evaluation, send blood and urine cx, serial lactate levels, monitor vitals closely hydration, monitor urine output and renal profile, iv abx initiated -started on zosyn and vancomycin .Seen by pulmonologist Dr Tena . CTA performed -no PE , found to have pericardial effusion and cardiology evaluation requested Admitted  to telemetry unit for monitoring , send 3 sets of cardiac ensymes to rule out coronary event, obtain ECHO to evaluate LVEF, cardiology consult ,continue current management, O2 supply, anticoagulation plan as per cardiology consult Palliative care consult requested ,to discuss advance directives and complete MOLST

## 2019-07-23 NOTE — PROGRESS NOTE ADULT - SUBJECTIVE AND OBJECTIVE BOX
IVAN RUFF [facility]  [phone]  1925 DOA 2019 DR SUPRIYA PIERRE   ALLERGY   nka      CONTACT    Angel MENDOZA  selv [phone]           Initial evaluation/Pulmonary Critical Care consultation requested on 2019   by Dr Pierre   from Dr Tena   Patient examined chart reviewed    HOSPITAL ADMISSION   PATIENT CAME  FROM (if information available)        TYPE OF VISIT      Subsequent Pulmonary followup     REASON FOR VISIT  PLEASE SEE PROBLEM LIST/ASSESSMENTAND RECOMMENDATIONS     PATIENT IVAN RUFF [facility]  [phone]  1925 [address]A 2019 DR SUPRIYA PIERRE                        VITALS/LABS    2019 afeb 78 110/70 18 100%   2019 W 8.9 Hb 11.2 Plt 274 INR 1.2    Na 138 K 3.7 CO2 26 Cr .4     PATIENT IVAN RUFF [facility]  [phone]  1925 DOA 2019 DR SUPRIYA PIERRE                        MEDICATIONS      A fib  ASA 81 ()   Dig .125 ()  DVT p   hpsc ()   ABIO  zosyn ()  vanco ()   CAD   Atorvastat 20 ()   CHF VALCULAR   enalapril 2.5 ()   Lasix 20 ()   SUP   pepcid 20 ()       GLOBAL ISSUE/BEST PRACTICE:      PROBLEM: HOB elevation:   y            PROBLEM: Stress ulcer proph:    na                      PROBLEM: VTE prophylaxis:     hpsc ()   PROBLEM: Glycemic control:    na  PROBLEM: Nutrition:  Dys 2 mech soft nectar ()    PROBLEM: Advanced directive: na     PROBLEM: Allergies:  na

## 2019-07-23 NOTE — PHYSICAL THERAPY INITIAL EVALUATION ADULT - RANGE OF MOTION EXAMINATION, REHAB EVAL
Pt requested to go to bathroom and signaled she was going to vomit  Patient did not vomit and Ricarda from [address] came to translate  Per Ricarda, Patient was feeling nauseous which is a normal daily occurrence for her  She also has a 7/10 headache which started after Rituxan started  Patient has a history of migraines  Patient denies any other symptoms at this time  Vitals stable  Spoke with YE Edmondson and ok to continue Rituxan and give patient Torodol  Patient agreeable  bilateral lower extremity ROM was WFL (within functional limits)/bilateral upper extremity ROM was WFL (within functional limits)

## 2019-07-23 NOTE — H&P ADULT - NEGATIVE GASTROINTESTINAL SYMPTOMS
no nausea/no vomiting/no diarrhea/no change in bowel habits/no flatulence/no constipation/no abdominal pain

## 2019-07-23 NOTE — DIETITIAN INITIAL EVALUATION ADULT. - PERTINENT MEDS FT
Heparin, Zosyn IVPB, Vancomycin, Atorvastatin, Colace, Vasotec, Pepcid, Robitussin, Lactobacillus Acidophilus

## 2019-07-23 NOTE — H&P ADULT - ATTENDING COMMENTS
89 year old female ,[facility] resident  with hx  of A-fib, HTN, HLD, and CVA presents with weakness the past 2-3 days associated  with onset SOB . Son noticed patient appeared more weak and fatigued than usual 2-3  days ago and she  "didn't walk as much" . Patient reports generalized body aches and increased weakness . Medical staff  of [facility] noticed labored breathing and sent patient to ER for evaluation .on arrival to ER she was HD stable , had SOB ,  denied CP , abd pain or urinary symptoms. no vomiting or diarrhea. Chest xray showed LLL pneumonia Admitted for septic workup and evaluation, send blood and urine cx, serial lactate levels, monitor vitals closely hydration, monitor urine output and renal profile, iv abx initiated -started on zosyn and vancomycin .Seen by pulmonologist Dr Tena . CTA performed -no PE , found to have pericardial effusion and cardiology evaluation requested Admitted  to telemetry unit for monitoring , send 3 sets of cardiac ensymes to rule out coronary event, obtain ECHO to evaluate LVEF, cardiology consult ,continue current management, O2 supply, anticoagulation plan as per cardiology consult Palliative care consult requested ,to discuss advance directives and complete MOLST

## 2019-07-23 NOTE — SWALLOW BEDSIDE ASSESSMENT ADULT - SWALLOW EVAL: RECOMMENDED FEEDING/EATING TECHNIQUES
hard swallow w/ each bite or sip/no straws/position upright (90 degrees)/small sips/bites/allow for swallow between intakes/check mouth frequently for oral residue/pocketing/maintain upright posture during/after eating for 30 mins/alternate food with liquid/oral hygiene

## 2019-07-23 NOTE — SWALLOW BEDSIDE ASSESSMENT ADULT - COMMENTS
Pt was awake and cooperative for a clinical assessment of swallow function this AM and presents with supplemental oxygen in place via nasal cannula. Per charting, pt is a "89 year old female ,[facility] resident  with hx  of A-fib, HTN, HLD, and CVA presents with weakness the past 2-3 days associated  with onset SOB . Son noticed patient appeared more weak and fatigued than usual 2-3  days ago and she  "didn't walk as much" . Patient reports generalized body aches and increased weakness . Medical staff  of [facility] noticed labored breathing and sent patient to ER for evaluation .on arrival to ER she was HD stable , had SOB ,  denied CP , abd pain or urinary symptoms. no vomiting or diarrhea. She is currently admitted for pneumonia and septic work up". Recent chest CT revealed "No evidence of pulmonary embolus . Moderate pericardial effusion Bilateral pleural effusions left greater than right with associated bibasilar atelectasis versus infiltrate. Mild nonspecific mediastinal lymphadenopathy "

## 2019-07-24 LAB
CULTURE RESULTS: NO GROWTH — SIGNIFICANT CHANGE UP
LEGIONELLA AG UR QL: NEGATIVE — SIGNIFICANT CHANGE UP
SPECIMEN SOURCE: SIGNIFICANT CHANGE UP
VANCOMYCIN TROUGH SERPL-MCNC: 2.6 UG/ML — LOW (ref 10–20)

## 2019-07-24 PROCEDURE — 74230 X-RAY XM SWLNG FUNCJ C+: CPT | Mod: 26

## 2019-07-24 RX ORDER — METOPROLOL TARTRATE 50 MG
25 TABLET ORAL EVERY 6 HOURS
Refills: 0 | Status: DISCONTINUED | OUTPATIENT
Start: 2019-07-24 | End: 2019-07-25

## 2019-07-24 RX ADMIN — Medication 100 MILLIGRAM(S): at 17:12

## 2019-07-24 RX ADMIN — Medication 650 MILLIGRAM(S): at 17:05

## 2019-07-24 RX ADMIN — Medication 650 MILLIGRAM(S): at 16:33

## 2019-07-24 RX ADMIN — Medication 25 MILLIGRAM(S): at 17:43

## 2019-07-24 RX ADMIN — Medication 1 TABLET(S): at 21:02

## 2019-07-24 RX ADMIN — Medication 1 TABLET(S): at 05:21

## 2019-07-24 RX ADMIN — Medication 100 MILLIGRAM(S): at 05:21

## 2019-07-24 RX ADMIN — ATORVASTATIN CALCIUM 20 MILLIGRAM(S): 80 TABLET, FILM COATED ORAL at 21:02

## 2019-07-24 RX ADMIN — HEPARIN SODIUM 5000 UNIT(S): 5000 INJECTION INTRAVENOUS; SUBCUTANEOUS at 17:12

## 2019-07-24 RX ADMIN — Medication 81 MILLIGRAM(S): at 12:12

## 2019-07-24 RX ADMIN — Medication 0.12 MILLIGRAM(S): at 05:21

## 2019-07-24 RX ADMIN — Medication 20 MILLIGRAM(S): at 05:21

## 2019-07-24 RX ADMIN — Medication 1 TABLET(S): at 17:11

## 2019-07-24 RX ADMIN — Medication 1 TABLET(S): at 15:39

## 2019-07-24 RX ADMIN — Medication 2.5 MILLIGRAM(S): at 05:21

## 2019-07-24 RX ADMIN — PIPERACILLIN AND TAZOBACTAM 25 GRAM(S): 4; .5 INJECTION, POWDER, LYOPHILIZED, FOR SOLUTION INTRAVENOUS at 05:21

## 2019-07-24 RX ADMIN — FAMOTIDINE 20 MILLIGRAM(S): 10 INJECTION INTRAVENOUS at 12:12

## 2019-07-24 NOTE — PROGRESS NOTE ADULT - SUBJECTIVE AND OBJECTIVE BOX
Patient chart was reviewed Patient has not been examined yet but will be done so later today and following that  the final note will be entered in the space below Workup and management orders based on current assessment have been entered to expedite patient care  Nursing notified for stat orders as applicable Meanwhile please refer to my previous Pulmonary Critical Care notes on this patient or call me directly IVAN RUFF [facility]  [phone]  1925 DOA 2019 DR SUPRIYA PIERRE   ALLERGY   nka      CONTACT    Angel MENDOZA  selv [phone]           Initial evaluation/Pulmonary Critical Care consultation requested on 2019   by Dr Pierre   from Dr Tena   Patient examined chart reviewed    HOSPITAL ADMISSION   PATIENT CAME  FROM (if information available)        TYPE OF VISIT      Subsequent Pulmonary followup     REASON FOR VISIT  PLEASE SEE PROBLEM LIST/ASSESSMENTAND RECOMMENDATIONS     PATIENT IVAN RUFF [facility] S  [phone]  1925 DOA 2019 DR SUPRIYA PIERRE                        VITALS/LABS    2019 afeb 99 176/68 On O2 90%  2019 pc .2-.17  2019 afeb 78 110/70 18 100%   2019 W 8.9 Hb 11.2 Plt 274 INR 1.2    Na 138 K 3.7 CO2 26 Cr .4     [facility]  [phone]  1925 DOA 2019 DR SUPRIYA PIERRE                        MEDICATIONS      A fib  ASA 81 ()   Dig .125 ()  DVT p   hpsc ()   ABIO  augmentin ()  zosyn (-)  vanco (-)   CAD   Atorvastat 20 ()   CHF VALCULAR   enalapril 2.5 ()   Lasix 20 ()   SUP   pepcid 20 ()     GLOBAL ISSUE/BEST PRACTICE:      PROBLEM: HOB elevation:   y            PROBLEM: Stress ulcer proph:    na                      PROBLEM: VTE prophylaxis:     hpsc ()   PROBLEM: Glycemic control:    na  PROBLEM: Nutrition:  Dys 2 mech soft nectar ()    PROBLEM: Advanced directive: na     PROBLEM: Allergies:  na    REVIEW OF SYMPTOMS     NOTE Changess if any  in ROS and PE are updated in daily HOSPITAL COURSE below      Able to give ROS  Yes     RELIABLE No   CONSTITUTIONAL Weakness Yes  Chills No Vision changes No  ENDOCRINE No unexplained hair loss No heat or cold intolerance    ALLERGY No hives  Sore throat No   RESP Coughing blood no  Shortness of breath YES   NEURO No Headache  Confusion Pain neck No   CARDIAC No Chest pain No Palpitations   GI No Pain abdomen NO   Vomiting NO     PHYSICAL EXAM    HEENT Unremarkable PERRLA atraumatic   RESP Fair air entry EXP prolonged    Harsh breath sound Resp distres mild   CARDIAC S1 S2 No S3     NO JVD    ABDOMEN SOFT BS PRESENT NOT DISTENDED No hepatosplenomegaly PEDAL EDEMA present No calf tenderness  NO rash   GENERAL Not TOXIC

## 2019-07-24 NOTE — PROGRESS NOTE ADULT - SUBJECTIVE AND OBJECTIVE BOX
Chief Complaint: Shortness of breath    Interval Events: No events overnight. No complaints.    Review of Systems:  General: No fevers, chills, weight loss or gain  Skin: No rashes, color changes  Cardiovascular: No chest pain, orthopnea  Respiratory: No shortness of breath, cough  Gastrointestinal: No nausea, abdominal pain  Genitourinary: No incontinence, pain with urination  Musculoskeletal: No pain, swelling, decreased range of motion  Neurological: No headache, weakness  Psychiatric: No depression, anxiety  Endocrine: No weight loss or gain, increased thirst  All other systems are comprehensively negative.    Physical Exam:  Vitals:        Vital Signs Last 24 Hrs  T(C): 36.8 (24 Jul 2019 00:06), Max: 36.9 (23 Jul 2019 16:57)  T(F): 98.3 (24 Jul 2019 00:06), Max: 98.5 (23 Jul 2019 16:57)  HR: 99 (24 Jul 2019 00:06) (76 - 99)  BP: 149/88 (24 Jul 2019 05:02) (110/73 - 176/68)  BP(mean): --  RR: 18 (24 Jul 2019 05:02) (17 - 18)  SpO2: 95% (24 Jul 2019 05:02) (90% - 100%)  General: NAD  HEENT: MMM  Neck: No JVD, no carotid bruit  Lungs: CTAB  CV: RRR, nl S1/S2, no M/R/G  Abdomen: S/NT/ND, +BS  Extremities: No LE edema, no cyanosis  Neuro: AAOx3, non-focal  Skin: No rash    Labs:                        11.2   8.90  )-----------( 274      ( 23 Jul 2019 08:10 )             31.4     07-23    138  |  105  |  15  ----------------------------<  98  3.7   |  26  |  0.49<L>    Ca    8.5      23 Jul 2019 08:10  Phos  2.5     07-23    TPro  6.9  /  Alb  2.6<L>  /  TBili  0.7  /  DBili  x   /  AST  23  /  ALT  16  /  AlkPhos  101  07-23    CARDIAC MARKERS ( 23 Jul 2019 17:04 )  .008 ng/mL / x     / x     / x     / x      CARDIAC MARKERS ( 23 Jul 2019 08:10 )  .005 ng/mL / x     / x     / x     / x          PT/INR - ( 23 Jul 2019 08:10 )   PT: 13.1 sec;   INR: 1.20 ratio         PTT - ( 22 Jul 2019 19:12 )  PTT:37.3 sec    Telemetry: AF

## 2019-07-24 NOTE — SWALLOW VFSS/MBS ASSESSMENT ADULT - NS SWALLOW VFSS REC ASPIR MON
oral hygiene/cough/gurgly voice/position upright (90Y)/throat clearing/upper respiratory infection/fever/pneumonia/change of breathing pattern

## 2019-07-24 NOTE — SWALLOW VFSS/MBS ASSESSMENT ADULT - DIAGNOSTIC IMPRESSIONS
1- mild oral dysphagia given solid, puree, honey thick liquid nectar thick liquid and thin liquid textures marked by delayed bolus collection, transfer and transport with posterior loss to the oropharynx noted. prolonged oral preparation/AP transport further noted given solids resulting in trace lingual residue post swallow, which pt reduces given liquid wash. 2- mild pharyngeal dysphagia given solid, puree, honey thick liquid and nectar thick liquids marked by delayed swallow trigger, reduced tongue base propulsion, reduced epiglottic deflection and reduced hyolaryngeal excursion resulting in trace residue at the base of tongue and posterior pharyngeal wall. no penetration/aspiration viewed. 3- moderate-severe pharyngeal dysphagia given thin liquids marked by delayed swallow trigger, reduced tongue base propulsion, reduced epiglottic deflection, reduced hyolaryngeal excursion and incomplete closure of the laryngeal vestibule resulting in silent, trace penetration with and without full retrieval. pt is not sensitive to penetrated material as marked by absent cough response. trace residue remained at the base of tongue and posterior pharyngeal wall post swallow. 5- incorporation of compensatory strategies (i.e. chin tuck) unsuccessful in allowing for adequate airway protection during the swallow.

## 2019-07-24 NOTE — GOALS OF CARE CONVERSATION - PERSONAL ADVANCE DIRECTIVE - CONVERSATION DETAILS
Spoke to pts son regarding advance directives. Resuscitation was explained he states he understands. Does not want CPR initiated and does not want pt to be intubated

## 2019-07-24 NOTE — SWALLOW VFSS/MBS ASSESSMENT ADULT - COMMENTS
Pt was received in the radiology suite awake and cooperative this AM with supplemental oxygen in place via nasal cannula. Per charting, pt is a "89 year old female ,[facility] resident  with hx  of A-fib, HTN, HLD, and CVA presents with weakness the past 2-3 days associated  with onset SOB . Son noticed patient appeared more weak and fatigued than usual 2-3  days ago and she  "didn't walk as much" . Patient reports generalized body aches and increased weakness . Medical staff  of [facility] noticed labored breathing and sent patient to ER for evaluation .on arrival to ER she was HD stable , had SOB ,  denied CP , abd pain or urinary symptoms. no vomiting or diarrhea. She is currently admitted for pneumonia and septic work up". Recent chest CT revealed "No evidence of pulmonary embolus . Moderate pericardial effusion Bilateral pleural effusions left greater than right with associated bibasilar atelectasis versus infiltrate. Mild nonspecific mediastinal lymphadenopathy "

## 2019-07-24 NOTE — SWALLOW VFSS/MBS ASSESSMENT ADULT - SPECIFY REASON(S)
to objectively assess swallow function. pt is familiar to this service for a clinical assessment of swallow function completed on 7/23/2019 (please see report)

## 2019-07-24 NOTE — SWALLOW VFSS/MBS ASSESSMENT ADULT - ORAL PHASE
Delayed oral transit time/Reduced anterior - posterior transport/Uncontrolled bolus / spillover in catia-pharynx

## 2019-07-24 NOTE — GOALS OF CARE CONVERSATION - PERSONAL ADVANCE DIRECTIVE - NS PRO AD PATIENT TYPE ON CHART
Health Care Proxy (HCP)/Power of  (POA) for Healthcare Issues/Medical Orders for Life-Sustaining Treatment (MOLST)

## 2019-07-24 NOTE — PROGRESS NOTE ADULT - SUBJECTIVE AND OBJECTIVE BOX
PROGRESS NOTE  Patient is a 89y old  Female who presents with a chief complaint of SOB (2019 15:43)  Chart and available morning labs /imaging are reviewed electronically , urgent issues addressed . More information  is being added upon completion of rounds , when more information is collected and management discussed with consultants , medical staff and social service/case management on the floor   OVERNIGHT  No new issues reported by medical staff . All above noted Patient is resting in a bed comfortably .Confused ,poor mentation .No distress noted   HPI:  89 year old female ,[facility] resident  with hx  of A-fib, HTN, HLD, and CVA presents with weakness the past 2-3 days associated  with onset SOB . Son noticed patient appeared more weak and fatigued than usual 2-3  days ago and she  "didn't walk as much" . Patient reports generalized body aches and increased weakness . Medical staff  of [facility] noticed labored breathing and sent patient to ER for evaluation .on arrival to ER she was HD stable , had SOB ,  denied CP , abd pain or urinary symptoms. no vomiting or diarrhea. Chest xray showed LLL pneumonia Admitted for septic workup and evaluation, send blood and urine cx, serial lactate levels, monitor vitals closely hydration, monitor urine output and renal profile, iv abx initiated -started on zosyn and vancomycin .Seen by pulmonologist Dr Tena . CTA performed -no PE , found to have pericardial effusion and cardiology evaluation requested Admitted  to telemetry unit for monitoring , send 3 sets of cardiac ensymes to rule out coronary event, obtain ECHO to evaluate LVEF, cardiology consult ,continue current management, O2 supply, anticoagulation plan as per cardiology consult Palliative care consult requested ,to discuss advance directives and complete MOLST (2019 07:09)  PAST MEDICAL & SURGICAL HISTORY:  Cerebrovascular accident (CVA)  High cholesterol  Atrial fibrillation  Hypertension  MEDICATIONS  (STANDING):  aspirin  chewable 81 milliGRAM(s) Oral daily  atorvastatin 20 milliGRAM(s) Oral at bedtime  digoxin     Tablet 0.125 milliGRAM(s) Oral daily  docusate sodium 100 milliGRAM(s) Oral two times a day  enalapril 2.5 milliGRAM(s) Oral daily  famotidine    Tablet 20 milliGRAM(s) Oral daily  furosemide    Tablet 20 milliGRAM(s) Oral daily  heparin  Injectable 5000 Unit(s) SubCutaneous every 12 hours  lactobacillus acidophilus 1 Tablet(s) Oral every 8 hours  piperacillin/tazobactam IVPB.. 3.375 Gram(s) IV Intermittent every 8 hours  vancomycin  IVPB 750 milliGRAM(s) IV Intermittent every 12 hours  MEDICATIONS  (PRN):  acetaminophen   Tablet .. 650 milliGRAM(s) Oral every 6 hours PRN Temp greater or equal to 38C (100.4F), Mild Pain (1 - 3)  guaiFENesin    Syrup 200 milliGRAM(s) Oral every 6 hours PRN Cough  OBJECTIVE  T(C): 36.8 (19 @ 00:06), Max: 36.9 (19 @ 16:57)  HR: 99 (19 @ 00:06) (76 - 99)  BP: 149/88 (19 @ 05:02) (110/73 - 176/68)  RR: 18 (19 @ 05:02) (17 - 18)  SpO2: 95% (19 @ 05:02) (90% - 100%)  Wt(kg): --  I&O's Summary    2019 07:01  -  2019 07:00  --------------------------------------------------------  IN: 450 mL / OUT: 200 mL / NET: 250 mL  REVIEW OF SYSTEMS:  A 10-point review of systems was obtained.   Review of systems otherwise unchanged compared to prior visit except as previously noted    PHYSICAL EXAM:  Appearance: NAD. VS past 24 hrs -as above   HEENT:   Moist oral mucosa. Conjunctiva clear b/l.   Neck : supple  Respiratory: Lungs diminished   Gastrointestinal:  Soft, nontender. No rebound. No rigidity. BS present	  Cardiovascular: RRR ,S1S2 present  Neurologic: Non-focal. Moving all extremities.  Extremities: No edema. No erythema. No calf tenderness.  Skin: No rashes, No ecchymoses, No cyanosis.	  wounds ,skin lesions-See skin assesment flow sheet   LABS:                        11.2   8.90  )-----------( 274      ( 2019 08:10 )             31.4     07-23  138  |  105  |  15  ----------------------------<  98  3.7   |  26  |  0.49<L>  Ca    8.5      2019 08:10  Phos  2.5       TPro  6.9  /  Alb  2.6<L>  /  TBili  0.7  /  DBili  x   /  AST  23  /  ALT  16  /  AlkPhos  101  07-  CAPILLARY BLOOD GLUCOSE  PT/INR - ( 2019 08:10 )   PT: 13.1 sec;   INR: 1.20 ratio    PTT - ( 2019 19:12 )  PTT:37.3 sec  Urinalysis Basic - ( 2019 11:20 )  Color: Yellow / Appearance: Slightly Turbid / S.015 / pH: x  Gluc: x / Ketone: Negative  / Bili: Negative / Urobili: 4 mg/dL   Blood: x / Protein: 100 mg/dL / Nitrite: Negative   Leuk Esterase: Small / RBC: 0-2 /HPF / WBC 26-50   Sq Epi: x / Non Sq Epi: Few / Bacteria: Many  Culture - Blood (collected 2019 00:14)  Source: .Blood  Preliminary Report (2019 01:01):    No growth to date.  Culture - Blood (collected 2019 00:14)  Source: .Blood  Preliminary Report (2019 01:01):    No growth to date.  RADIOLOGY & ADDITIONAL TESTS:< from: US Transthoracic Echocardiogram w/Doppler Complete (19 @ 09:04) >  EXAM:  US TTE W DOPPLER COMPLETE                        PROCEDURE DATE:  2019   INTERPRETATION:  Ordering Physician: CHRISTOPHER TENA [id number]  Indication: CHF. Dyspnea.  Technician: Mateo Shrestha  Study Quality: Fair  Height: 5 feet 7 inches  Weight: 143 pounds  Blood Pressure: 135/73  MEASUREMENTS  IVS: 1.2 cm  PWT: 1.0 cm  LA: 4.7 cm  Aortic Root: 3.4 cm  LVIDd: 4.0 cm  LVIDs: 2.2 cm  LVEF: Approximately 70%  FINDINGS  Left Ventricle: Normal left ventricular size and systolic function with   estimated ejection fraction 70%.  Right Ventricle: Normal right ventricular size and function.  Left Atrium: Moderate left atrial enlargement.  Right Atrium: Right atrial enlargement.  Mitral Valve: Normal mitral valve. Mild to moderate mitral regurgitation.  Aortic Valve: Calcified aortic valve with decreased opening. The peak and   mean transaortic gradients are approximately 30 mmHg at 18 mmHg,   respectively, and the calculated aortic valve area is 1.1 sq cm,   consistent with moderate aortic stenosis.  Tricuspid Valve: Normal tricuspid valve. Mild tricuspid regurgitation.   Pulmonary artery systolic pressure estimated at 37 mmHg, assuming a right   atrial pressure of 3 mmHg, consistent with mild pulmonary hypertension.  Pulmonic Valve: Normal pulmonic valve. Mild pulmonic insufficiency.  Pericardium/Pleura: Medium-sized pericardial effusion. Left pleural   effusion.  CONCLUSIONS:  1. Normal left ventricular size and systolic function with estimated   ejection fraction 70%.  2. Biatrial enlargement.  3. Mild to moderate mitral regurgitation.  4. Moderate aortic stenosis.  5. Mild tricuspid regurgitation.  6. Mild pulmonary hypertension.  7. Medium-sized pericardial effusion. Left pleuraleffusion.  < end of copied text >   reviewed elctronically

## 2019-07-24 NOTE — SWALLOW VFSS/MBS ASSESSMENT ADULT - RECOMMENDED FEEDING/EATING TECHNIQUES
alternate food with liquid/hard swallow w/ each bite or sip/no straws/allow for swallow between intakes/provide rest periods between swallows/maintain upright posture during/after eating for 30 mins/oral hygiene/small sips/bites/check mouth frequently for oral residue/pocketing/position upright (90 degrees)

## 2019-07-24 NOTE — PROVIDER CONTACT NOTE (OTHER) - ASSESSMENT
axox2 baseline Confused, denies chest pain or sob, no s/s of distress noted at this time eating dinner and tolerating well. BP stable at this time afebrile, no s/s of discomfort or pain

## 2019-07-25 LAB
ALBUMIN SERPL ELPH-MCNC: 2.7 G/DL — LOW (ref 3.3–5)
ALP SERPL-CCNC: 102 U/L — SIGNIFICANT CHANGE UP (ref 30–120)
ALT FLD-CCNC: 21 U/L DA — SIGNIFICANT CHANGE UP (ref 10–60)
ANION GAP SERPL CALC-SCNC: 6 MMOL/L — SIGNIFICANT CHANGE UP (ref 5–17)
AST SERPL-CCNC: 23 U/L — SIGNIFICANT CHANGE UP (ref 10–40)
BILIRUB SERPL-MCNC: 0.8 MG/DL — SIGNIFICANT CHANGE UP (ref 0.2–1.2)
BUN SERPL-MCNC: 12 MG/DL — SIGNIFICANT CHANGE UP (ref 7–23)
CALCIUM SERPL-MCNC: 8.5 MG/DL — SIGNIFICANT CHANGE UP (ref 8.4–10.5)
CHLORIDE SERPL-SCNC: 104 MMOL/L — SIGNIFICANT CHANGE UP (ref 96–108)
CO2 SERPL-SCNC: 29 MMOL/L — SIGNIFICANT CHANGE UP (ref 22–31)
CREAT SERPL-MCNC: 0.47 MG/DL — LOW (ref 0.5–1.3)
GLUCOSE SERPL-MCNC: 106 MG/DL — HIGH (ref 70–99)
HCT VFR BLD CALC: 35.8 % — SIGNIFICANT CHANGE UP (ref 34.5–45)
HGB BLD-MCNC: 11.6 G/DL — SIGNIFICANT CHANGE UP (ref 11.5–15.5)
INR BLD: 1.26 RATIO — HIGH (ref 0.88–1.16)
MAGNESIUM SERPL-MCNC: 1.6 MG/DL — SIGNIFICANT CHANGE UP (ref 1.6–2.6)
MCHC RBC-ENTMCNC: 29.3 PG — SIGNIFICANT CHANGE UP (ref 27–34)
MCHC RBC-ENTMCNC: 32.4 GM/DL — SIGNIFICANT CHANGE UP (ref 32–36)
MCV RBC AUTO: 90.4 FL — SIGNIFICANT CHANGE UP (ref 80–100)
NRBC # BLD: 0 /100 WBCS — SIGNIFICANT CHANGE UP (ref 0–0)
PHOSPHATE SERPL-MCNC: 2.7 MG/DL — SIGNIFICANT CHANGE UP (ref 2.5–4.5)
PLATELET # BLD AUTO: 318 K/UL — SIGNIFICANT CHANGE UP (ref 150–400)
POTASSIUM SERPL-MCNC: 2.8 MMOL/L — CRITICAL LOW (ref 3.5–5.3)
POTASSIUM SERPL-MCNC: 4.2 MMOL/L — SIGNIFICANT CHANGE UP (ref 3.5–5.3)
POTASSIUM SERPL-SCNC: 2.8 MMOL/L — CRITICAL LOW (ref 3.5–5.3)
POTASSIUM SERPL-SCNC: 4.2 MMOL/L — SIGNIFICANT CHANGE UP (ref 3.5–5.3)
PROT SERPL-MCNC: 7.1 G/DL — SIGNIFICANT CHANGE UP (ref 6–8.3)
PROTHROM AB SERPL-ACNC: 13.8 SEC — HIGH (ref 10–12.9)
RBC # BLD: 3.96 M/UL — SIGNIFICANT CHANGE UP (ref 3.8–5.2)
RBC # FLD: 14.5 % — SIGNIFICANT CHANGE UP (ref 10.3–14.5)
S PNEUM AG SER QL: SIGNIFICANT CHANGE UP
SODIUM SERPL-SCNC: 139 MMOL/L — SIGNIFICANT CHANGE UP (ref 135–145)
WBC # BLD: 8.91 K/UL — SIGNIFICANT CHANGE UP (ref 3.8–10.5)
WBC # FLD AUTO: 8.91 K/UL — SIGNIFICANT CHANGE UP (ref 3.8–10.5)

## 2019-07-25 RX ORDER — MAGNESIUM OXIDE 400 MG ORAL TABLET 241.3 MG
400 TABLET ORAL DAILY
Refills: 0 | Status: DISCONTINUED | OUTPATIENT
Start: 2019-07-25 | End: 2019-07-26

## 2019-07-25 RX ORDER — POTASSIUM CHLORIDE 20 MEQ
40 PACKET (EA) ORAL EVERY 4 HOURS
Refills: 0 | Status: COMPLETED | OUTPATIENT
Start: 2019-07-25 | End: 2019-07-25

## 2019-07-25 RX ORDER — METOPROLOL TARTRATE 50 MG
25 TABLET ORAL
Refills: 0 | Status: DISCONTINUED | OUTPATIENT
Start: 2019-07-25 | End: 2019-07-26

## 2019-07-25 RX ADMIN — HEPARIN SODIUM 5000 UNIT(S): 5000 INJECTION INTRAVENOUS; SUBCUTANEOUS at 06:26

## 2019-07-25 RX ADMIN — HEPARIN SODIUM 5000 UNIT(S): 5000 INJECTION INTRAVENOUS; SUBCUTANEOUS at 17:13

## 2019-07-25 RX ADMIN — ATORVASTATIN CALCIUM 20 MILLIGRAM(S): 80 TABLET, FILM COATED ORAL at 21:14

## 2019-07-25 RX ADMIN — Medication 25 MILLIGRAM(S): at 17:12

## 2019-07-25 RX ADMIN — Medication 40 MILLIEQUIVALENT(S): at 13:34

## 2019-07-25 RX ADMIN — Medication 0.12 MILLIGRAM(S): at 06:27

## 2019-07-25 RX ADMIN — Medication 25 MILLIGRAM(S): at 00:24

## 2019-07-25 RX ADMIN — Medication 40 MILLIEQUIVALENT(S): at 09:13

## 2019-07-25 RX ADMIN — Medication 1 TABLET(S): at 06:28

## 2019-07-25 RX ADMIN — Medication 1 TABLET(S): at 17:12

## 2019-07-25 RX ADMIN — Medication 2.5 MILLIGRAM(S): at 06:27

## 2019-07-25 RX ADMIN — Medication 25 MILLIGRAM(S): at 06:28

## 2019-07-25 RX ADMIN — Medication 100 MILLIGRAM(S): at 06:27

## 2019-07-25 RX ADMIN — Medication 81 MILLIGRAM(S): at 11:04

## 2019-07-25 RX ADMIN — Medication 1 TABLET(S): at 21:14

## 2019-07-25 RX ADMIN — FAMOTIDINE 20 MILLIGRAM(S): 10 INJECTION INTRAVENOUS at 11:04

## 2019-07-25 RX ADMIN — Medication 650 MILLIGRAM(S): at 17:12

## 2019-07-25 RX ADMIN — Medication 20 MILLIGRAM(S): at 06:27

## 2019-07-25 RX ADMIN — Medication 1 TABLET(S): at 06:27

## 2019-07-25 RX ADMIN — MAGNESIUM OXIDE 400 MG ORAL TABLET 400 MILLIGRAM(S): 241.3 TABLET ORAL at 22:34

## 2019-07-25 RX ADMIN — Medication 1 TABLET(S): at 13:34

## 2019-07-25 NOTE — PROGRESS NOTE ADULT - SUBJECTIVE AND OBJECTIVE BOX
PROGRESS NOTE  Patient is a 89y old  Female who presents with a chief complaint of SOB (25 Jul 2019 08:40)  Chart and available morning labs /imaging are reviewed electronically , urgent issues addressed . More information  is being added upon completion of rounds , when more information is collected and management discussed with consultants , medical staff and social service/case management on the floor   OVERNIGHT  Episode of Rapid AF last night  reported by medical staff . All above noted Patient is resting in a bed comfortably  .No distress noted POTASSIUM IS BEING RERPLACED   HPI:  89 year old female ,[facility] resident  with hx  of A-fib, HTN, HLD, and CVA presents with weakness the past 2-3 days associated  with onset SOB . Son noticed patient appeared more weak and fatigued than usual 2-3  days ago and she  "didn't walk as much" . Patient reports generalized body aches and increased weakness . Medical staff  of [facility] noticed labored breathing and sent patient to ER for evaluation .on arrival to ER she was HD stable , had SOB ,  denied CP , abd pain or urinary symptoms. no vomiting or diarrhea. Chest xray showed LLL pneumonia Admitted for septic workup and evaluation, send blood and urine cx, serial lactate levels, monitor vitals closely hydration, monitor urine output and renal profile, iv abx initiated -started on zosyn and vancomycin .Seen by pulmonologist Dr Tena . CTA performed -no PE , found to have pericardial effusion and cardiology evaluation requested Admitted  to telemetry unit for monitoring , send 3 sets of cardiac ensymes to rule out coronary event, obtain ECHO to evaluate LVEF, cardiology consult ,continue current management, O2 supply, anticoagulation plan as per cardiology consult Palliative care consult requested ,to discuss advance directives and complete MOLST (23 Jul 2019 07:09)  PAST MEDICAL & SURGICAL HISTORY:  Cerebrovascular accident (CVA)  High cholesterol  Atrial fibrillation  Hypertension  MEDICATIONS  (STANDING):  amoxicillin  500 milliGRAM(s)/clavulanate 1 Tablet(s) Oral two times a day  aspirin  chewable 81 milliGRAM(s) Oral daily  atorvastatin 20 milliGRAM(s) Oral at bedtime  digoxin     Tablet 0.125 milliGRAM(s) Oral daily  docusate sodium 100 milliGRAM(s) Oral two times a day  enalapril 2.5 milliGRAM(s) Oral daily  famotidine    Tablet 20 milliGRAM(s) Oral daily  furosemide    Tablet 20 milliGRAM(s) Oral daily  heparin  Injectable 5000 Unit(s) SubCutaneous every 12 hours  lactobacillus acidophilus 1 Tablet(s) Oral every 8 hours  metoprolol tartrate 25 milliGRAM(s) Oral two times a day  potassium chloride   Powder 40 milliEquivalent(s) Oral every 4 hours    MEDICATIONS  (PRN):  acetaminophen   Tablet .. 650 milliGRAM(s) Oral every 6 hours PRN Temp greater or equal to 38C (100.4F), Mild Pain (1 - 3)  guaiFENesin    Syrup 200 milliGRAM(s) Oral every 6 hours PRN Cough      OBJECTIVE    T(C): 36.9 (07-25-19 @ 09:00), Max: 36.9 (07-24-19 @ 21:23)  HR: 87 (07-25-19 @ 09:00) (72 - 106)  BP: 147/60 (07-25-19 @ 09:00) (138/79 - 173/75)  RR: 18 (07-25-19 @ 09:00) (16 - 18)  SpO2: 86% (07-25-19 @ 11:10) (86% - 95%)  Wt(kg): --  I&O's Summary    REVIEW OF SYSTEMS:  A 10-point review of systems was obtained.   Review of systems otherwise unchanged compared to prior visit except as previously noted    PHYSICAL EXAM:  Appearance: NAD. VS past 24 hrs -as above   HEENT:   Moist oral mucosa. Conjunctiva clear b/l.   Neck : supple  Respiratory: Lungs diminished   Gastrointestinal:  Soft, nontender. No rebound. No rigidity. BS present	  Cardiovascular: RRR ,S1S2 present  Neurologic: Non-focal. Moving all extremities.  Extremities: No edema. No erythema. No calf tenderness.  Skin: No rashes, No ecchymoses, No cyanosis.	  wounds ,skin lesions-See skin assesment flow sheet       LABS:                        11.6   8.91  )-----------( 318      ( 25 Jul 2019 07:42 )             35.8     07-25    139  |  104  |  12  ----------------------------<  106<H>  2.8<LL>   |  29  |  0.47<L>    Ca    8.5      25 Jul 2019 07:42  Phos  2.7     07-25    TPro  7.1  /  Alb  2.7<L>  /  TBili  0.8  /  DBili  x   /  AST  23  /  ALT  21  /  AlkPhos  102  07-25    CAPILLARY BLOOD GLUCOSE        PT/INR - ( 25 Jul 2019 07:42 )   PT: 13.8 sec;   INR: 1.26 ratio               Culture - Urine (collected 23 Jul 2019 17:00)  Source: .Urine  Final Report (24 Jul 2019 13:52):    No growth    Culture - Blood (collected 23 Jul 2019 00:14)  Source: .Blood  Preliminary Report (24 Jul 2019 01:01):    No growth to date.    Culture - Blood (collected 23 Jul 2019 00:14)  Source: .Blood  Preliminary Report (24 Jul 2019 01:01):    No growth to date.      RADIOLOGY & ADDITIONAL TESTS:< from: Xray Modified Barium Swallow (07.24.19 @ 10:31) >  EXAM:  SWALLOW FUNCTION WITH VIDEO                                  PROCEDURE DATE:  07/24/2019          INTERPRETATION:  Modified barium swallow        CLINICAL INFORMATION: Dysphagia.      TECHNIQUE:  Multiple ingested items were observed under fluoroscopy.    Total time of fluoroscopic exposure for the patient during this   examination was 1 minute and 3 seconds.    FINDINGS:  Patient was offered variety of barium coated of textures.     IMPRESSION:    See separate report from swallowing/speech therapist for further details.    < end of copied text >  < from: US Chest (07.23.19 @ 16:44) >  EXAM:  US CHEST                                  PROCEDURE DATE:  07/23/2019          INTERPRETATION:  Chest ultrasound    History: Prethoracentesis evaluation    Sonographic evaluation of the patient's left chest was performed in   anticipation ofdiagnostic thoracentesis. However, no adequate volume of   fluid was identified to allow safe needle or catheter placement.   Thoracentesis was not performed.    Thank you for the courtesy of this referral.    < end of copied text >     reviewed elctronically  	  45minutes spent on this visit, 50% visit time spent in care co-ordination with other attendings and counselling patient  I have discussed care plan with patient and HCP,expressed understanding of problems treatment and their effect and side effects, alternatives in detail,I have asked if they have any questions and concerns and appropriately addressed them to best of my ability

## 2019-07-25 NOTE — PROVIDER CONTACT NOTE (OTHER) - REASON
patient complaining of leg cramps patient complaining of leg cramps, heart rate elevated to 120 nonsustained

## 2019-07-25 NOTE — PROGRESS NOTE ADULT - SUBJECTIVE AND OBJECTIVE BOX
Chief Complaint: Shortness of breath    Interval Events: No events overnight. No complaints.    Review of Systems:  General: No fevers, chills, weight loss or gain  Skin: No rashes, color changes  Cardiovascular: No chest pain, orthopnea  Respiratory: No shortness of breath, cough  Gastrointestinal: No nausea, abdominal pain  Genitourinary: No incontinence, pain with urination  Musculoskeletal: No pain, swelling, decreased range of motion  Neurological: No headache, weakness  Psychiatric: No depression, anxiety  Endocrine: No weight loss or gain, increased thirst  All other systems are comprehensively negative.    Physical Exam:  Vital Signs Last 24 Hrs  T(C): 36.3 (25 Jul 2019 05:00), Max: 36.9 (24 Jul 2019 21:23)  T(F): 97.3 (25 Jul 2019 05:00), Max: 98.5 (24 Jul 2019 21:23)  HR: 101 (25 Jul 2019 05:00) (72 - 106)  BP: 161/78 (25 Jul 2019 05:00) (138/79 - 173/75)  BP(mean): --  RR: 17 (25 Jul 2019 05:00) (16 - 18)  SpO2: 93% (25 Jul 2019 05:00) (93% - 97%)  General: NAD  HEENT: MMM  Neck: No JVD, no carotid bruit  Lungs: CTAB  CV: RRR, nl S1/S2, no M/R/G  Abdomen: S/NT/ND, +BS  Extremities: No LE edema, no cyanosis  Neuro: AAOx3, non-focal  Skin: No rash    Labs:             07-25    139  |  104  |  12  ----------------------------<  106<H>  2.8<LL>   |  29  |  0.47<L>    Ca    8.5      25 Jul 2019 07:42  Phos  2.7     07-25    TPro  7.1  /  Alb  2.7<L>  /  TBili  0.8  /  DBili  x   /  AST  23  /  ALT  21  /  AlkPhos  102  07-25      Telemetry: AF, intermittent pauses

## 2019-07-25 NOTE — PROVIDER CONTACT NOTE (OTHER) - ACTION/TREATMENT ORDERED:
warm compresses applied, repeat mg and potassium serum
as per MD orders give dose metoprolol 25mg now and q6 continue to monitor rhythm and BP. will continue to monitor

## 2019-07-25 NOTE — PROGRESS NOTE ADULT - SUBJECTIVE AND OBJECTIVE BOX
Patient was examined Chart reviewed Detailed note will be inserted below after going over latest data Meanwhile please refer to my previous notes for Pulmonary/Critical Care assessment recommendations IVAN RUFF [facility] [address]  [phone]  [address] DOA 2019 DR SUPRIYA PIERRE   ALLERGY   nka      CONTACT    Son Bernadine MENDOZA  selv [phone]           Initial evaluation/Pulmonary Critical Care consultation requested on 2019   by Dr Pierre   from Dr Tena   Patient examined chart reviewed    HOSPITAL ADMISSION   PATIENT CAME  FROM (if information available)        TYPE OF VISIT      Subsequent Pulmonary followup     REASON FOR VISIT  PLEASE SEE PROBLEM LIST/ASSESSMENTAND RECOMMENDATIONS     PATIENT IVAN RUFF [facility]  [phone]  [address] DOA 2019 DR SUPRIYA PIERRE                        VITALS/LABS    2019 afeb 100 160/70   2019 W 8.9 Hb 11 Plt 318  Na 139 K 2.8 CO2 29 Cr .4     PATIENT IVNA RUFF [facility]  [phone]  [address] DOA 2019 DR SUPRIYA PIERRE                        MEDICATIONS      A fib  ASA 81 ()   Dig .125 ()  DVT p   hpsc ()   ABIO  augmentin ()  zosyn (-)  vanco (-)   CAD   Atorvastat 20 ()   CHF VALCULAR   enalapril 2.5 ()   Lasix 20 ()   SUP   pepcid 20 ()                    REVIEW OF SYMPTOMS     NOTE Changess if any  in ROS and PE are updated in daily HOSPITAL COURSE below      Able to give ROS  Yes     RELIABLE No   CONSTITUTIONAL Weakness Yes  Chills No Vision changes No  ENDOCRINE No unexplained hair loss No heat or cold intolerance    ALLERGY No hives  Sore throat No   RESP Coughing blood no  Shortness of breath YES   NEURO No Headache  Confusion Pain neck No   CARDIAC No Chest pain No Palpitations   GI No Pain abdomen NO   Vomiting NO     PHYSICAL EXAM    HEENT Unremarkable PERRLA atraumatic   RESP Fair air entry EXP prolonged    Harsh breath sound Resp distres mild   CARDIAC S1 S2 No S3     NO JVD    ABDOMEN SOFT BS PRESENT NOT DISTENDED No hepatosplenomegaly PEDAL EDEMA present No calf tenderness  NO rash   GENERAL Not TOXIC

## 2019-07-26 ENCOUNTER — TRANSCRIPTION ENCOUNTER (OUTPATIENT)
Age: 84
End: 2019-07-26

## 2019-07-26 VITALS
HEART RATE: 97 BPM | DIASTOLIC BLOOD PRESSURE: 61 MMHG | RESPIRATION RATE: 18 BRPM | OXYGEN SATURATION: 94 % | SYSTOLIC BLOOD PRESSURE: 157 MMHG | TEMPERATURE: 97 F

## 2019-07-26 LAB
ALBUMIN SERPL ELPH-MCNC: 2.8 G/DL — LOW (ref 3.3–5)
ALP SERPL-CCNC: 101 U/L — SIGNIFICANT CHANGE UP (ref 30–120)
ALT FLD-CCNC: 23 U/L DA — SIGNIFICANT CHANGE UP (ref 10–60)
ANION GAP SERPL CALC-SCNC: 7 MMOL/L — SIGNIFICANT CHANGE UP (ref 5–17)
AST SERPL-CCNC: 22 U/L — SIGNIFICANT CHANGE UP (ref 10–40)
BILIRUB SERPL-MCNC: 0.6 MG/DL — SIGNIFICANT CHANGE UP (ref 0.2–1.2)
BUN SERPL-MCNC: 13 MG/DL — SIGNIFICANT CHANGE UP (ref 7–23)
CALCIUM SERPL-MCNC: 8.6 MG/DL — SIGNIFICANT CHANGE UP (ref 8.4–10.5)
CHLORIDE SERPL-SCNC: 104 MMOL/L — SIGNIFICANT CHANGE UP (ref 96–108)
CO2 SERPL-SCNC: 27 MMOL/L — SIGNIFICANT CHANGE UP (ref 22–31)
CREAT SERPL-MCNC: 0.48 MG/DL — LOW (ref 0.5–1.3)
GLUCOSE SERPL-MCNC: 98 MG/DL — SIGNIFICANT CHANGE UP (ref 70–99)
HCT VFR BLD CALC: 35.3 % — SIGNIFICANT CHANGE UP (ref 34.5–45)
HGB BLD-MCNC: 11.2 G/DL — LOW (ref 11.5–15.5)
INR BLD: 1.24 RATIO — HIGH (ref 0.88–1.16)
MAGNESIUM SERPL-MCNC: 1.7 MG/DL — SIGNIFICANT CHANGE UP (ref 1.6–2.6)
MCHC RBC-ENTMCNC: 28.9 PG — SIGNIFICANT CHANGE UP (ref 27–34)
MCHC RBC-ENTMCNC: 31.7 GM/DL — LOW (ref 32–36)
MCV RBC AUTO: 91.2 FL — SIGNIFICANT CHANGE UP (ref 80–100)
NRBC # BLD: 0 /100 WBCS — SIGNIFICANT CHANGE UP (ref 0–0)
PHOSPHATE SERPL-MCNC: 3 MG/DL — SIGNIFICANT CHANGE UP (ref 2.5–4.5)
PLATELET # BLD AUTO: 307 K/UL — SIGNIFICANT CHANGE UP (ref 150–400)
POTASSIUM SERPL-MCNC: 4 MMOL/L — SIGNIFICANT CHANGE UP (ref 3.5–5.3)
POTASSIUM SERPL-SCNC: 4 MMOL/L — SIGNIFICANT CHANGE UP (ref 3.5–5.3)
PROT SERPL-MCNC: 6.5 G/DL — SIGNIFICANT CHANGE UP (ref 6–8.3)
PROTHROM AB SERPL-ACNC: 13.6 SEC — HIGH (ref 10–12.9)
RBC # BLD: 3.87 M/UL — SIGNIFICANT CHANGE UP (ref 3.8–5.2)
RBC # FLD: 14.6 % — HIGH (ref 10.3–14.5)
SODIUM SERPL-SCNC: 138 MMOL/L — SIGNIFICANT CHANGE UP (ref 135–145)
WBC # BLD: 9.94 K/UL — SIGNIFICANT CHANGE UP (ref 3.8–10.5)
WBC # FLD AUTO: 9.94 K/UL — SIGNIFICANT CHANGE UP (ref 3.8–10.5)

## 2019-07-26 PROCEDURE — 81001 URINALYSIS AUTO W/SCOPE: CPT

## 2019-07-26 PROCEDURE — 84443 ASSAY THYROID STIM HORMONE: CPT

## 2019-07-26 PROCEDURE — 85610 PROTHROMBIN TIME: CPT

## 2019-07-26 PROCEDURE — 83880 ASSAY OF NATRIURETIC PEPTIDE: CPT

## 2019-07-26 PROCEDURE — 97161 PT EVAL LOW COMPLEX 20 MIN: CPT

## 2019-07-26 PROCEDURE — 76604 US EXAM CHEST: CPT

## 2019-07-26 PROCEDURE — 87899 AGENT NOS ASSAY W/OPTIC: CPT

## 2019-07-26 PROCEDURE — 80202 ASSAY OF VANCOMYCIN: CPT

## 2019-07-26 PROCEDURE — 36415 COLL VENOUS BLD VENIPUNCTURE: CPT

## 2019-07-26 PROCEDURE — 71045 X-RAY EXAM CHEST 1 VIEW: CPT

## 2019-07-26 PROCEDURE — 87449 NOS EACH ORGANISM AG IA: CPT

## 2019-07-26 PROCEDURE — 85027 COMPLETE CBC AUTOMATED: CPT

## 2019-07-26 PROCEDURE — 97110 THERAPEUTIC EXERCISES: CPT

## 2019-07-26 PROCEDURE — 97116 GAIT TRAINING THERAPY: CPT

## 2019-07-26 PROCEDURE — 83615 LACTATE (LD) (LDH) ENZYME: CPT

## 2019-07-26 PROCEDURE — 87086 URINE CULTURE/COLONY COUNT: CPT

## 2019-07-26 PROCEDURE — 84132 ASSAY OF SERUM POTASSIUM: CPT

## 2019-07-26 PROCEDURE — 93306 TTE W/DOPPLER COMPLETE: CPT

## 2019-07-26 PROCEDURE — 80162 ASSAY OF DIGOXIN TOTAL: CPT

## 2019-07-26 PROCEDURE — 84484 ASSAY OF TROPONIN QUANT: CPT

## 2019-07-26 PROCEDURE — 97530 THERAPEUTIC ACTIVITIES: CPT

## 2019-07-26 PROCEDURE — 74230 X-RAY XM SWLNG FUNCJ C+: CPT

## 2019-07-26 PROCEDURE — 84100 ASSAY OF PHOSPHORUS: CPT

## 2019-07-26 PROCEDURE — 85730 THROMBOPLASTIN TIME PARTIAL: CPT

## 2019-07-26 PROCEDURE — 83735 ASSAY OF MAGNESIUM: CPT

## 2019-07-26 PROCEDURE — 87040 BLOOD CULTURE FOR BACTERIA: CPT

## 2019-07-26 PROCEDURE — 84145 PROCALCITONIN (PCT): CPT

## 2019-07-26 PROCEDURE — 80053 COMPREHEN METABOLIC PANEL: CPT

## 2019-07-26 PROCEDURE — 83605 ASSAY OF LACTIC ACID: CPT

## 2019-07-26 PROCEDURE — 32555 ASPIRATE PLEURA W/ IMAGING: CPT

## 2019-07-26 PROCEDURE — 71275 CT ANGIOGRAPHY CHEST: CPT

## 2019-07-26 PROCEDURE — 93005 ELECTROCARDIOGRAM TRACING: CPT

## 2019-07-26 PROCEDURE — 99285 EMERGENCY DEPT VISIT HI MDM: CPT | Mod: 25

## 2019-07-26 RX ORDER — ATENOLOL 25 MG/1
1 TABLET ORAL
Qty: 0 | Refills: 0 | DISCHARGE

## 2019-07-26 RX ORDER — LACTOBACILLUS ACIDOPHILUS 100MM CELL
2 CAPSULE ORAL
Qty: 20 | Refills: 0
Start: 2019-07-26 | End: 2019-08-04

## 2019-07-26 RX ORDER — DIGOXIN 250 MCG
1 TABLET ORAL
Qty: 30 | Refills: 0
Start: 2019-07-26 | End: 2019-08-24

## 2019-07-26 RX ORDER — METOPROLOL TARTRATE 50 MG
1 TABLET ORAL
Qty: 60 | Refills: 0
Start: 2019-07-26 | End: 2019-08-24

## 2019-07-26 RX ORDER — SENNA PLUS 8.6 MG/1
1 TABLET ORAL
Qty: 30 | Refills: 0
Start: 2019-07-26 | End: 2019-08-24

## 2019-07-26 RX ORDER — DIGOXIN 250 MCG
1 TABLET ORAL
Qty: 0 | Refills: 0 | DISCHARGE

## 2019-07-26 RX ORDER — ASPIRIN/CALCIUM CARB/MAGNESIUM 324 MG
1 TABLET ORAL
Qty: 0 | Refills: 0 | DISCHARGE

## 2019-07-26 RX ORDER — ATORVASTATIN CALCIUM 80 MG/1
1 TABLET, FILM COATED ORAL
Qty: 0 | Refills: 0 | DISCHARGE

## 2019-07-26 RX ORDER — ATORVASTATIN CALCIUM 80 MG/1
1 TABLET, FILM COATED ORAL
Qty: 30 | Refills: 0
Start: 2019-07-26 | End: 2019-08-24

## 2019-07-26 RX ORDER — ASPIRIN/CALCIUM CARB/MAGNESIUM 324 MG
1 TABLET ORAL
Qty: 30 | Refills: 0
Start: 2019-07-26 | End: 2019-08-24

## 2019-07-26 RX ORDER — DOCUSATE SODIUM 100 MG
1 CAPSULE ORAL
Qty: 0 | Refills: 0 | DISCHARGE

## 2019-07-26 RX ORDER — FUROSEMIDE 40 MG
1 TABLET ORAL
Qty: 30 | Refills: 0
Start: 2019-07-26 | End: 2019-08-24

## 2019-07-26 RX ORDER — DOCUSATE SODIUM 100 MG
3 CAPSULE ORAL
Qty: 90 | Refills: 0
Start: 2019-07-26 | End: 2019-08-24

## 2019-07-26 RX ORDER — AMLODIPINE BESYLATE 2.5 MG/1
1 TABLET ORAL
Qty: 0 | Refills: 0 | DISCHARGE

## 2019-07-26 RX ORDER — SENNA PLUS 8.6 MG/1
1 TABLET ORAL AT BEDTIME
Refills: 0 | Status: DISCONTINUED | OUTPATIENT
Start: 2019-07-26 | End: 2019-07-26

## 2019-07-26 RX ADMIN — MAGNESIUM OXIDE 400 MG ORAL TABLET 400 MILLIGRAM(S): 241.3 TABLET ORAL at 11:17

## 2019-07-26 RX ADMIN — Medication 25 MILLIGRAM(S): at 05:53

## 2019-07-26 RX ADMIN — Medication 1 TABLET(S): at 05:53

## 2019-07-26 RX ADMIN — Medication 20 MILLIGRAM(S): at 05:54

## 2019-07-26 RX ADMIN — Medication 81 MILLIGRAM(S): at 11:13

## 2019-07-26 RX ADMIN — Medication 1 TABLET(S): at 05:54

## 2019-07-26 RX ADMIN — Medication 0.12 MILLIGRAM(S): at 05:53

## 2019-07-26 RX ADMIN — Medication 1 TABLET(S): at 13:20

## 2019-07-26 RX ADMIN — FAMOTIDINE 20 MILLIGRAM(S): 10 INJECTION INTRAVENOUS at 11:13

## 2019-07-26 RX ADMIN — Medication 100 MILLIGRAM(S): at 05:53

## 2019-07-26 RX ADMIN — HEPARIN SODIUM 5000 UNIT(S): 5000 INJECTION INTRAVENOUS; SUBCUTANEOUS at 06:13

## 2019-07-26 RX ADMIN — Medication 2.5 MILLIGRAM(S): at 05:53

## 2019-07-26 NOTE — PROGRESS NOTE ADULT - SUBJECTIVE AND OBJECTIVE BOX
PROGRESS NOTE  Patient is a 89y old  Female who presents with a chief complaint of SOB (26 Jul 2019 08:19)  Chart and available morning labs /imaging are reviewed electronically , urgent issues addressed . More information  is being added upon completion of rounds , when more information is collected and management discussed with consultants , medical staff and social service/case management on the floor     OVERNIGHT  No new issues reported by medical staff . All above noted Patient is resting in a bed comfortably ..No distress noted   Cleared by Dr Roper for d/c home O2 supply  TBD ,d/w med staff  HPI:  89 year old female ,[facility] resident  with hx  of A-fib, HTN, HLD, and CVA presents with weakness the past 2-3 days associated  with onset SOB . Son noticed patient appeared more weak and fatigued than usual 2-3  days ago and she  "didn't walk as much" . Patient reports generalized body aches and increased weakness . Medical staff  of [facility] noticed labored breathing and sent patient to ER for evaluation .on arrival to ER she was HD stable , had SOB ,  denied CP , abd pain or urinary symptoms. no vomiting or diarrhea. Chest xray showed LLL pneumonia Admitted for septic workup and evaluation, send blood and urine cx, serial lactate levels, monitor vitals closely hydration, monitor urine output and renal profile, iv abx initiated -started on zosyn and vancomycin .Seen by pulmonologist Dr Tena . CTA performed -no PE , found to have pericardial effusion and cardiology evaluation requested Admitted  to telemetry unit for monitoring , send 3 sets of cardiac ensymes to rule out coronary event, obtain ECHO to evaluate LVEF, cardiology consult ,continue current management, O2 supply, anticoagulation plan as per cardiology consult Palliative care consult requested ,to discuss advance directives and complete MOLST (23 Jul 2019 07:09)    PAST MEDICAL & SURGICAL HISTORY:  Cerebrovascular accident (CVA)  High cholesterol  Atrial fibrillation  Hypertension      MEDICATIONS  (STANDING):  amoxicillin  500 milliGRAM(s)/clavulanate 1 Tablet(s) Oral two times a day  aspirin  chewable 81 milliGRAM(s) Oral daily  atorvastatin 20 milliGRAM(s) Oral at bedtime  digoxin     Tablet 0.125 milliGRAM(s) Oral daily  docusate sodium 100 milliGRAM(s) Oral two times a day  enalapril 2.5 milliGRAM(s) Oral daily  famotidine    Tablet 20 milliGRAM(s) Oral daily  furosemide    Tablet 20 milliGRAM(s) Oral daily  heparin  Injectable 5000 Unit(s) SubCutaneous every 12 hours  lactobacillus acidophilus 1 Tablet(s) Oral every 8 hours  magnesium oxide 400 milliGRAM(s) Oral daily  metoprolol tartrate 25 milliGRAM(s) Oral two times a day  senna 1 Tablet(s) Oral at bedtime    MEDICATIONS  (PRN):  acetaminophen   Tablet .. 650 milliGRAM(s) Oral every 6 hours PRN Temp greater or equal to 38C (100.4F), Mild Pain (1 - 3)  guaiFENesin    Syrup 200 milliGRAM(s) Oral every 6 hours PRN Cough      OBJECTIVE    T(C): 36.9 (07-26-19 @ 05:02), Max: 37.1 (07-25-19 @ 16:56)  HR: 98 (07-26-19 @ 05:02) (74 - 100)  BP: 152/79 (07-26-19 @ 05:02) (120/84 - 152/79)  RR: 18 (07-26-19 @ 05:02) (18 - 19)  SpO2: 93% (07-26-19 @ 05:02) (86% - 95%)  Wt(kg): --  I&O's Summary        REVIEW OF SYSTEMS:  CONSTITUTIONAL: No fever, weight loss, or fatigue  EYES: No eye pain, visual disturbances, or discharge  ENMT:   No sinus or throat pain  NECK: No pain or stiffness  RESPIRATORY: No cough, wheezing, chills or hemoptysis; No shortness of breath  CARDIOVASCULAR: No chest pain, palpitations, dizziness, or leg swelling  GASTROINTESTINAL: No abdominal pain. No nausea, vomiting; No diarrhea or constipation. No melena or hematochezia.  GENITOURINARY: No dysuria, frequency, hematuria, or incontinence  NEUROLOGICAL: No headaches, memory loss, loss of strength, numbness, or tremors  SKIN: No itching, burning, rashes, or lesions   MUSCULOSKELETAL: No joint pain or swelling; No muscle, back, or extremity pain    PHYSICAL EXAM:  Appearance: NAD. VS past 24 hrs -as above   HEENT:   Moist oral mucosa. Conjunctiva clear b/l.   Neck : supple  Respiratory: Lungs CTAB.  Gastrointestinal:  Soft, nontender. No rebound. No rigidity. BS present	  Cardiovascular: RRR ,S1S2 present  Neurologic: Non-focal. Moving all extremities.  Extremities: No edema. No erythema. No calf tenderness.  Skin: No rashes, No ecchymoses, No cyanosis.	  wounds ,skin lesions-See skin assesment flow sheet   LABS:                        11.6   8.91  )-----------( 318      ( 25 Jul 2019 07:42 )             35.8     07-25    x   |  x   |  x   ----------------------------<  x   4.2   |  x   |  x     Ca    8.5      25 Jul 2019 07:42  Phos  2.7     07-25  Mg     1.6     07-25    TPro  7.1  /  Alb  2.7<L>  /  TBili  0.8  /  DBili  x   /  AST  23  /  ALT  21  /  AlkPhos  102  07-25    CAPILLARY BLOOD GLUCOSE        PT/INR - ( 25 Jul 2019 07:42 )   PT: 13.8 sec;   INR: 1.26 ratio               Culture - Urine (collected 23 Jul 2019 17:00)  Source: .Urine  Final Report (24 Jul 2019 13:52):    No growth    Culture - Blood (collected 23 Jul 2019 00:14)  Source: .Blood  Preliminary Report (24 Jul 2019 01:01):    No growth to date.    Culture - Blood (collected 23 Jul 2019 00:14)  Source: .Blood  Preliminary Report (24 Jul 2019 01:01):    No growth to date.      RADIOLOGY & ADDITIONAL TESTS:< from: Xray Modified Barium Swallow (07.24.19 @ 10:31) >  EXAM:  SWALLOW FUNCTION WITH VIDEO                                  PROCEDURE DATE:  07/24/2019          INTERPRETATION:  Modified barium swallow        CLINICAL INFORMATION: Dysphagia.      TECHNIQUE:  Multiple ingested items were observed under fluoroscopy.    Total time of fluoroscopic exposure for the patient during this   examination was 1 minute and 3 seconds.    FINDINGS:  Patient was offered variety of barium coated of textures.     IMPRESSION:    See separate report from swallowing/speech therapist for further details.    < end of copied text >     reviewed elctronically  	 Patient was seen and examined on a day of discharge . Plan of care , discharge medications and recommendations discussed with consultants and clearance for discharge obtained .Social service , case management  and medical staff are aware of plan. Family is notified. Discharge summary  is  prepared electronically  75 minutes spent on this visit, 50% visit time spent in care co-ordination with other attendings and counselling patient  I have discussed care plan with patient and HCP,expressed understanding of problems treatment and their effect and side effects, alternatives in detail,I have asked if they have any questions and concerns and appropriately addressed them to best of my ability OOBTC/PT .Advance care planning was d/w the family.Pain is accessed and addresed.Pt was screened for signs of clinical depression .Appropriate referral made.Risk of falls accessed.Fall prevention d/w family .Pt is screened for tobacco and etoh,counseling was done for etoh and tobacco cessation.Use of narcotic pain meds was discussed.Pt is advised to use narcotic meds  wisely and to avoid overusing them.Plan of care d/w family and all questions answered to best of my knowledge Patient was seen and examined on a day of discharge . Plan of care , discharge medications and recommendations discussed with consultants and clearance for discharge obtained .Social service , case management  and medical staff are aware of plan. Family is notified. Discharge summary  is  prepared electronically -see separate document attached

## 2019-07-26 NOTE — PROGRESS NOTE ADULT - ATTENDING COMMENTS
89 year old female ,[facility] resident  with hx  of A-fib, HTN, HLD, and CVA presents with weakness the past 2-3 days associated  with onset SOB . Son noticed patient appeared more weak and fatigued than usual 2-3  days ago and she  "didn't walk as much" . Patient reports generalized body aches and increased weakness . Medical staff  of [facility] noticed labored breathing and sent patient to ER for evaluation .on arrival to ER she was HD stable , had SOB ,  denied CP , abd pain or urinary symptoms. no vomiting or diarrhea. Chest xray showed LLL pneumonia Admitted for septic workup and evaluation, send blood and urine cx, serial lactate levels, monitor vitals closely hydration, monitor urine output and renal profile, iv abx initiated -started on zosyn and vancomycin .Seen by pulmonologist Dr Tena . CTA performed -no PE , found to have pericardial effusion and cardiology evaluation requested Admitted  to telemetry unit for monitoring , send 3 sets of cardiac ensymes to rule out coronary event, obtain ECHO to evaluate LVEF, cardiology consult ,continue current management, O2 supply, anticoagulation plan as per cardiology consult Palliative care consult requested ,to discuss advance directives and complete MOLST
89 year old female ,[facility] resident  with hx  of A-fib, HTN, HLD, and CVA presents with weakness the past 2-3 days associated  with onset SOB . Son noticed patient appeared more weak and fatigued than usual 2-3  days ago and she  "didn't walk as much" . Patient reports generalized body aches and increased weakness . Medical staff  of [facility] noticed labored breathing and sent patient to ER for evaluation .on arrival to ER she was HD stable , had SOB ,  denied CP , abd pain or urinary symptoms. no vomiting or diarrhea. Chest xray showed LLL pneumonia Admitted for septic workup and evaluation, send blood and urine cx, serial lactate levels, monitor vitals closely hydration, monitor urine output and renal profile, iv abx initiated -started on zosyn and vancomycin .Seen by pulmonologist Dr Tena . CTA performed -no PE , found to have pericardial effusion and cardiology evaluation requested Admitted  to telemetry unit for monitoring , send 3 sets of cardiac ensymes to rule out coronary event, obtain ECHO to evaluate LVEF, cardiology consult ,continue current management, O2 supply, anticoagulation plan as per cardiology consult Palliative care consult requested ,to discuss advance directives and complete MOLST
DISCHARGE BACK TO [facility] WHEN ARRANGED BY  .CHECK O2 SATURATION ON ROOM AIR AT REST AND ON AMBULATION ,IF IT IS BELOW/EQUAL 88% NOTIFY  ,SO HE CAN PRESCRIBE OXYGEN .3122 IS COMPLETED ,ALL ESCRIPTS ARE SENT TO [facility]

## 2019-07-26 NOTE — PROGRESS NOTE ADULT - PROBLEM SELECTOR PLAN 3
- Continue furosemide 20 mg PO daily  - CT chest with small/moderate bilateral pleural effusions, moderate pericardial effusion  - Echocardiogram with normal/hyperdynamic LV systolic function, moderate pericardial effusion. Incomplete study to evaluate for increased intrapericardial pressures but no obvious chamber collapse.
Thoracocentesis as per Dr Tena order Admit to monitored unit for cardiac monitoring, obtain echo to evaluate LVEF, diuresis, monitor ins/outs, monitor renal profile and electrolytes closely, cardiology consult ,send 3 sets of ensymes, O2 supply, serial chest xrays, monitor weights and oral intake of fluids, nutritionist consult
- Continue furosemide 20 mg PO daily  - CT chest with small/moderate bilateral pleural effusions, moderate pericardial effusion  - Echocardiogram with normal/hyperdynamic LV systolic function, moderate pericardial effusion. Incomplete study to evaluate for increased intrapericardial pressures but no obvious chamber collapse.

## 2019-07-26 NOTE — PROGRESS NOTE ADULT - PROBLEM SELECTOR PLAN 4
aspiration precautions ,speech and swallow eval , MBS
aspiration precautions ,speech and swallow eval , MBS is pending
aspiration precautions ,speech and swallow eval , MBS

## 2019-07-26 NOTE — PROGRESS NOTE ADULT - PROVIDER SPECIALTY LIST ADULT
Cardiology
Hospitalist
Hospitalist
Pulmonology
Hospitalist

## 2019-07-26 NOTE — DISCHARGE NOTE PROVIDER - CARE PROVIDER_API CALL
Ekaterina Nicole)  Internal Medicine  [address] [facility], [address]  [place] [address]  Phone: [phone]  Fax: [phone]  Follow Up Time: 1 week    Orlin Tena)  Critical Care Medicine; Internal Medicine; Pulmonary Disease  [address] [place] [address]  Phone: [phone]  Fax: [phone]  Follow Up Time: 1 month    Red Roper)  Cardiovascular Disease; Internal Medicine  [address] [place] [address], [address]  [place] [address]  Phone: [phone]  Fax: [phone]  Follow Up Time: 1 month

## 2019-07-26 NOTE — PROGRESS NOTE ADULT - PROBLEM SELECTOR PLAN 1
ON AUGMENTIN ,ASPIRATION PRECAUTIONS
duration of abx as per Dr Tena
ON AUGMENTIN ,ASPIRATION PRECAUTIONS

## 2019-07-26 NOTE — DISCHARGE NOTE PROVIDER - NSDCCPCAREPLAN_GEN_ALL_CORE_FT
PRINCIPAL DISCHARGE DIAGNOSIS  Diagnosis: Pneumonia of left lower lobe due to infectious organism  Assessment and Plan of Treatment: LIKELY 2/2 TO ASPIRATION ,POA      SECONDARY DISCHARGE DIAGNOSES  Diagnosis: Chronic CHF  Assessment and Plan of Treatment: DIASTOLIC HF ,POA    Diagnosis: Atrial fibrillation  Assessment and Plan of Treatment: Atrial fibrillation    Diagnosis: Pleural effusion  Assessment and Plan of Treatment: Pleural effusion    Diagnosis: High cholesterol  Assessment and Plan of Treatment: High cholesterol    Diagnosis: Hypertension  Assessment and Plan of Treatment: Hypertension    Diagnosis: Dysphagia  Assessment and Plan of Treatment: Dysphagia

## 2019-07-26 NOTE — PROGRESS NOTE ADULT - PROBLEM SELECTOR PLAN 2
- Continue digoxin 0.125 mg daily  - Started on metoprolol yesterday due to rapid AF. Lower to metoprolol tartrate 25 mg bid.  - Continue aspirin 81 mg daily. Unclear why patient is not on anticoagulation (possibly due to frailty and falls risk) - defer to outside physician
Admitted  to telemetry unit for monitoring , send 3 sets of cardiac ensymes to rule out coronary event, obtain ECHO to evaluate LVEF, cardiology consult ,continue current management, O2 supply, anticoagulation plan as per cardiology consult
- Continue digoxin 0.125 mg daily  - Started on metoprolol yesterday due to rapid AF. Lower to metoprolol tartrate 25 mg bid.  - Continue aspirin 81 mg daily. Unclear why patient is not on anticoagulation (possibly due to frailty and falls risk) - defer to outside physician

## 2019-07-26 NOTE — PROGRESS NOTE ADULT - PROBLEM SELECTOR PLAN 5
Admitted  to telemetry unit for monitoring , send 3 sets of cardiac ensymes to rule out coronary event, obtain ECHO to evaluate LVEF, cardiology consult ,continue current management, O2 supply, anticoagulation plan as per cardiology consult
continue home medications ,card consult called Admitted  to telemetry unit for monitoring , send 3 sets of cardiac ensymes to rule out coronary event, obtain ECHO to evaluate LVEF, cardiology consult ,continue current management, O2 supply, anticoagulation plan as per cardiology consult
Admitted  to telemetry unit for monitoring , send 3 sets of cardiac ensymes to rule out coronary event, obtain ECHO to evaluate LVEF, cardiology consult ,continue current management, O2 supply, anticoagulation plan as per cardiology consult

## 2019-07-26 NOTE — PROGRESS NOTE ADULT - ASSESSMENT
89 year old female ,[facility] resident  with hx  of A-fib, HTN, HLD, and CVA presents with weakness the past 2-3 days associated  with onset SOB . Son noticed patient appeared more weak and fatigued than usual 2-3  days ago and she  "didn't walk as much" . Patient reports generalized body aches and increased weakness . Medical staff  of [facility] noticed labored breathing and sent patient to ER for evaluation .on arrival to ER she was HD stable , had SOB ,  denied CP , abd pain or urinary symptoms. no vomiting or diarrhea. Chest xray showed LLL pneumonia Admitted for septic workup and evaluation, send blood and urine cx, serial lactate levels, monitor vitals closely hydration, monitor urine output and renal profile, iv abx initiated -started on zosyn and vancomycin .Seen by pulmonologist Dr Tena . CTA performed -no PE , found to have pericardial effusion and cardiology evaluation requested Admitted  to telemetry unit for monitoring , send 3 sets of cardiac ensymes to rule out coronary event, obtain ECHO to evaluate LVEF, cardiology consult ,continue current management, O2 supply, anticoagulation plan as per cardiology consult Palliative care consult requested ,to discuss advance directives and complete MOLST
89 year old female ,[facility] resident  with hx  of A-fib, HTN, HLD, and CVA presents with weakness the past 2-3 days associated  with onset SOB . Son noticed patient appeared more weak and fatigued than usual 2-3  days ago and she  "didn't walk as much" . Patient reports generalized body aches and increased weakness . Medical staff  of [facility] noticed labored breathing and sent patient to ER for evaluation .on arrival to ER she was HD stable , had SOB ,  denied CP , abd pain or urinary symptoms. no vomiting or diarrhea. Chest xray showed LLL pneumonia Admitted for septic workup and evaluation, send blood and urine cx, serial lactate levels, monitor vitals closely hydration, monitor urine output and renal profile, iv abx initiated -started on zosyn and vancomycin .Seen by pulmonologist Dr Tena . CTA performed -no PE , found to have pericardial effusion and cardiology evaluation requested Admitted  to telemetry unit for monitoring , send 3 sets of cardiac ensymes to rule out coronary event, obtain ECHO to evaluate LVEF, cardiology consult ,continue current management, O2 supply, anticoagulation plan as per cardiology consult Palliative care consult requested ,to discuss advance directives and complete MOLST
PATIENT IVAN RUFF [facility] S  [id number]  1925 DOA 2019 DR SUPRIYA BREWER                                                Patient description                          89 f PMH A fib on ASA dig htn hld cva was admitted [facility] S 2019 with weakness x 2 d labored breathing     Hospital course                                  Pt was managed for  following problems   FEVER   Poss ec pneumoniz Rxed Vanco () zosyn ()   PL EFFSN  cta ch excluded pe as cause of effsn   CHF VALVULAR    bnp was elevated ECHO showed mod mr mod as elda 1.1   A fib   meds were continued        PATIENT IVAN RUFF [facility] S  [id number]  1925 DOA 2019 DR SUPRIYA BREWER                                                PROBLEM/ASSESSMENT/RECOMMENDATIONS (A/R)       FEVER   2019 101f    pc .15   -2019 W 12.2-8.9  PNEUMONIA   2019 cxr l basal infiltr effsn   2019 cta ch   basal atelectasis v infiltr  er meds vanco ()  zosyn ()   A/R 2019 Cont vanco zosyn   2019 Await blood ciultures thoracentesis   DYSPNEA  A/R 2019 monitor po target po 90-95%  RO PE   2019 cta ch n   A/R PE unlikely   PLEURAL EFFSN   2019 cta ch   Mod l pl effs   2019 tsh 2.3   A/R 2019 Will dw fam and plan thorac   2019 left message for son   2019 Pt was refusing thoracentesis   PERICARDIAL EFFSN   2019 cta ch   mod pericard effs  A/R 2019 check echo clinically not in tamponade   2019 echo did not show chamber collapse Tamponade unlikely   To follow with Cardio   CHF VALVULAR     2019 bnp 2982   2019 Tr 1 n   2019 echo ef 70% medium pericard effsn Mod mr Mod as pasp 37 elda 1.1 ptavg 30 mtavg 18   enalapril 2.5 ()   Lasix 20 (7/23)   A/R Cont Rx Seems compensated  A fib  A/R 2019 Cont meds   2019 Cardio to decide re fullddose anticoag buy assessing prior bleed or fall risk     TIME SPENT Over 25 minutes aggregate care time spent on encounter; activities included   direct pt care, counseling and/or coordinating care reviewing notes, lab data/ imaging , discussion with multidisciplinary team/ pt /family. Risks, benefits, alternatives  discussed in detail.
The patient is a 89 year old female with a history of HTN, HL, CVA, atrial fibrillation who is admitted with pleural effusions, pericardial effusion, acute on chronic diastolic heart failure, possible PNA.    Plan:  - CT chest with small/moderate bilateral pleural effusions, moderate pericardial effusion  - Echocardiogram with normal/hyperdynamic LV systolic function, moderate pericardial effusion. Incomplete study to evaluate for increased intrapericardial pressures but no obvious chamber collapse.  - Continue digoxin 0.125 mg daily  - Continue furosemide 20 mg PO daily  - Continue metoprolol tartrate 25 mg bid  - Continue aspirin 81 mg daily. Unclear why patient is not on anticoagulation (possibly due to frailty and falls risk) - defer to outside physicians.  - Left leg pain - pain control  - Discharge planning
The patient is a 89 year old female with a history of HTN, HL, CVA, atrial fibrillation who is admitted with pleural effusions, pericardial effusion, acute on chronic diastolic heart failure, possible PNA.    Plan:  - Continue furosemide 20 mg PO daily  - CT chest with small/moderate bilateral pleural effusions, moderate pericardial effusion  - Echocardiogram with normal/hyperdynamic LV systolic function, moderate pericardial effusion. Incomplete study to evaluate for increased intrapericardial pressures but no obvious chamber collapse.  - Continue digoxin 0.125 mg daily  - Continue aspirin 81 mg daily. Unclear why patient is not on anticoagulation (possibly due to frailty and falls risk) - defer to outside physicians.
The patient is a 89 year old female with a history of HTN, HL, CVA, atrial fibrillation who is admitted with pleural effusions, pericardial effusion, acute on chronic diastolic heart failure, possible PNA.    Plan:  - Continue furosemide 20 mg PO daily  - CT chest with small/moderate bilateral pleural effusions, moderate pericardial effusion  - Echocardiogram with normal/hyperdynamic LV systolic function, moderate pericardial effusion. Incomplete study to evaluate for increased intrapericardial pressures but no obvious chamber collapse.  - Continue digoxin 0.125 mg daily  - Started on metoprolol yesterday due to rapid AF. Lower to metoprolol tartrate 25 mg bid.  - Continue aspirin 81 mg daily. Unclear why patient is not on anticoagulation (possibly due to frailty and falls risk) - defer to outside physicians.  - Discharge planning
PATIENT IVAN RUFF [facility] S  [phone]  1925 DOA 2019 DR SUPRIYA BREWER                                                Patient description                          89 f PMH A fib on ASA dig htn hld cva was admitted [facility] S 2019 with weakness x 2 d labored breathing     Hospital course                                  Pt was managed for  following problems   FEVER   Poss ec pneumoniz Rxed Vanco () zosyn () Pt changed to augmentin on 2019   PL EFFSN  cta ch excluded pe as cause of effsn  sono interrogation showed scant effusion untappable   CHF VALVULAR    bnp was elevated ECHO showed mod mr mod as elda 1.1 Rxed with lasix and enalapril  A fib   meds were continued                  PATIENT IVAN RUFF [facility] S  [phone]  1925 DOA 2019 DR SUPRIYA BREWER                                                PROBLEM/ASSESSMENT/RECOMMENDATIONS (A/R)         FEVER   2019 101f    pc .15   -2019 W 12.2-8.9    PNEUMONIA   2019 blood culture n  2019 cxr l basal infiltr effsn   2019 cta ch   basal atelectasis v infiltr   strep ag n  leg n     urine culture n    blood culture n   er meds vanco ()  zosyn ()   A/R   No signi risk factiors for mrsa    Will change to augmentin    DYSPNEA  A/R 2019 monitor po target po 90-95%    RO PE   2019 cta ch n   A/R PE unlikely     PLEURAL EFFSN   2019 cta ch   Mod l pl effs   2019 tsh 2.3   2019 sono interrogation No tappable effusion  A/R Not enough fluid to tap    PERICARDIAL EFFSN   2019 cta ch   mod pericard effs  2019 echo ef 70% medium pericard effsn Mod mr Mod as pasp 37 elda 1.1 ptavg 30 mtavg 18   A/R 2019 echo did not show chamber collapse Tamponade unlikely   To follow with Cardio     CHF VALVULAR     2019 bnp 2982   2019 Tr 1 n   2019 echo ef 70% medium pericard effsn Mod mr Mod as pasp 37 elda 1.1 ptavg 30 mtavg 18   enalapril 2.5 ()   Lasix 20 ()   A/R Cont Rx Seems compensated    A fib  A/R 2019 Cont meds   2019 Cardio to decide re fullddose anticoag buy assessing prior bleed or fall risk     TIME SPENT Over 25 minutes aggregate care time spent on encounter; activities included   direct pt care, counseling and/or coordinating care reviewing notes, lab data/ imaging , discussion with multidisciplinary team/ pt /family. Risks, benefits, alternatives  discussed in detail
PATIENT IVAN RUFF [facility] S  [phone]  1925 DOA 2019 DR SUPRIYA BREWER                                                Patient description                          89 f PMH A fib on ASA dig htn hld cva was admitted [facility] S 2019 with weakness x 2 d labored breathing     Hospital course                                  Pt was managed for  following problems   FEVER   Poss ec pneumoniz Rxed Vanco () zosyn () Pt changed to augmentin on 2019   PL EFFSN  cta ch excluded pe as cause of effsn  sono interrogation showed scant effusion untappable   CHF VALVULAR    bnp was elevated ECHO showed mod mr mod as elda 1.1 Rxed with lasix and enalapril  A fib   meds were continued                  PATIENT IVAN RUFF [facility] S  [phone]  1925 DOA 2019 DR SUPRIYA BREWER                                                PROBLEM/ASSESSMENT/RECOMMENDATIONS (A/R)         FEVER   2019 101f    pc .15   -2019 W 12.2-8.9    PNEUMONIA   2019 blood culture n  2019 cxr l basal infiltr effsn   2019 cta ch   basal atelectasis v infiltr  er meds vanco ()  zosyn ()   A/R   No signi risk factiors for mrsa   Will change to augmentin    DYSPNEA  A/R 2019 monitor po target po 90-95%    RO PE   2019 cta ch n   A/R PE unlikely     PLEURAL EFFSN   2019 cta ch   Mod l pl effs   2019 tsh 2.3   2019 sono interrogation No tappable effusion  A/R Not enough fluid to tap    PERICARDIAL EFFSN   2019 cta ch   mod pericard effs  2019 echo ef 70% medium pericard effsn Mod mr Mod as pasp 37 elda 1.1 ptavg 30 mtavg 18   A/R 2019 echo did not show chamber collapse Tamponade unlikely   To follow with Cardio     CHF VALVULAR     2019 bnp 2982   2019 Tr 1 n   2019 echo ef 70% medium pericard effsn Mod mr Mod as pasp 37 elda 1.1 ptavg 30 mtavg 18   enalapril 2.5 ()   Lasix 20 ()   A/R Cont Rx Seems compensated    A fib  A/R 2019 Cont meds   2019 Cardio to decide re fullddose anticoag buy assessing prior bleed or fall risk     TIME SPENT Over 25 minutes aggregate care time spent on encounter; activities included   direct pt care, counseling and/or coordinating care reviewing notes, lab data/ imaging , discussion with multidisciplinary team/ pt /family. Risks, benefits, alternatives  discussed in detail
PATIENT IVAN RUFF [facility] S  [phone]  1925 DOA 2019 DR SUPRIYA BREWER                                                Patient description                          89 f PMH A fib on ASA dig htn hld cva was admitted [facility] S 2019 with weakness x 2 d labored breathing     Hospital course                                  Pt was managed for  following problems   FEVER   Poss ec pneumoniz Rxed Vanco () zosyn () Pt changed to augmentin on 2019   PL EFFSN  cta ch excluded pe as cause of effsn  sono interrogation showed scant effusion untappable   DYSPHAGIA    Speech recommended dys 2 nectar thick   CHF VALVULAR    bnp was elevated ECHO showed mod mr mod as elda 1.1 Rxed with lasix and enalapril  A fib   meds were continued                PATIENT IVAN RUFF [facility] S  [phone]  1925 DOA 2019 DR SUPRIYA BREWER                                                PROBLEM/ASSESSMENT/RECOMMENDATIONS (A/R)         FEVER   2019 101f    pc .15   -2019 W 12.2-8.9  A/R resolved     PNEUMONIA   2019 blood culture n  2019 cxr l basal infiltr effsn   2019 cta ch   basal atelectasis v infiltr   strep ag n  leg n     urine culture n    blood culture n   er meds vanco ()  zosyn ()   A/R   No signi risk factiors for mrsa    Will change to augmentin    DYSPHAGIA    Speech recommended dys 2 nectar thick     DYSPNEA  A/R 2019 monitor po target po 90-95%    RO PE   2019 cta ch n   A/R PE unlikely     PLEURAL EFFSN   2019 cta ch   Mod l pl effs   2019 tsh 2.3   2019 sono interrogation No tappable effusion  A/R Not enough fluid to tap    PERICARDIAL EFFSN   2019 cta ch   mod pericard effs  2019 echo ef 70% medium pericard effsn Mod mr Mod as pasp 37 elda 1.1 ptavg 30 mtavg 18   A/R 2019 echo did not show chamber collapse Tamponade unlikely   To follow with Cardio     CHF VALVULAR     2019 bnp 2982   2019 Tr 1 n   2019 echo ef 70% medium pericard effsn Mod mr Mod as pasp 37 elda 1.1 ptavg 30 mtavg 18   enalapril 2.5 ()   Lasix 20 ()   A/R Cont Rx Seems compensated    A fib  A/R 2019 Cont meds   2019 Cardio to decide re fullddose anticoag buy assessing prior bleed or fall risk     TIME SPENT Over 25 minutes aggregate care time spent on encounter; activities included   direct pt care, counseling and/or coordinating care reviewing notes, lab data/ imaging , discussion with multidisciplinary team/ pt /family. Risks, benefits, alternatives  discussed in det
89 year old female ,[facility] resident  with hx  of A-fib, HTN, HLD, and CVA presents with weakness the past 2-3 days associated  with onset SOB . Son noticed patient appeared more weak and fatigued than usual 2-3  days ago and she  "didn't walk as much" . Patient reports generalized body aches and increased weakness . Medical staff  of [facility] noticed labored breathing and sent patient to ER for evaluation .on arrival to ER she was HD stable , had SOB ,  denied CP , abd pain or urinary symptoms. no vomiting or diarrhea. Chest xray showed LLL pneumonia Admitted for septic workup and evaluation, send blood and urine cx, serial lactate levels, monitor vitals closely hydration, monitor urine output and renal profile, iv abx initiated -started on zosyn and vancomycin .Seen by pulmonologist Dr Tena . CTA performed -no PE , found to have pericardial effusion and cardiology evaluation requested Admitted  to telemetry unit for monitoring , send 3 sets of cardiac ensymes to rule out coronary event, obtain ECHO to evaluate LVEF, cardiology consult ,continue current management, O2 supply, anticoagulation plan as per cardiology consult Palliative care consult requested ,to discuss advance directives and complete MOLST

## 2019-07-26 NOTE — PROGRESS NOTE ADULT - SUBJECTIVE AND OBJECTIVE BOX
Chief Complaint: Shortness of breath    Interval Events: No events overnight. Complaining of left leg pain.    Review of Systems:  General: No fevers, chills, weight loss or gain  Skin: No rashes, color changes  Cardiovascular: No chest pain, orthopnea  Respiratory: No shortness of breath, cough  Gastrointestinal: No nausea, abdominal pain  Genitourinary: No incontinence, pain with urination  Musculoskeletal: No pain, swelling, decreased range of motion  Neurological: No headache, weakness  Psychiatric: No depression, anxiety  Endocrine: No weight loss or gain, increased thirst  All other systems are comprehensively negative.    Physical Exam:  Vital Signs Last 24 Hrs  T(C): 36.9 (26 Jul 2019 05:02), Max: 37.1 (25 Jul 2019 16:56)  T(F): 98.4 (26 Jul 2019 05:02), Max: 98.8 (25 Jul 2019 16:56)  HR: 98 (26 Jul 2019 05:02) (74 - 100)  BP: 152/79 (26 Jul 2019 05:02) (120/84 - 152/79)  BP(mean): --  RR: 18 (26 Jul 2019 05:02) (18 - 19)  SpO2: 93% (26 Jul 2019 05:02) (86% - 95%)  General: NAD  HEENT: MMM  Neck: No JVD, no carotid bruit  Lungs: CTAB  CV: RRR, nl S1/S2, no M/R/G  Abdomen: S/NT/ND, +BS  Extremities: No LE edema, no cyanosis  Neuro: AAOx3, non-focal  Skin: No rash    Labs:             07-25    x   |  x   |  x   ----------------------------<  x   4.2   |  x   |  x     Ca    8.5      25 Jul 2019 07:42  Phos  2.7     07-25  Mg     1.6     07-25    TPro  7.1  /  Alb  2.7<L>  /  TBili  0.8  /  DBili  x   /  AST  23  /  ALT  21  /  AlkPhos  102  07-25                        11.6   8.91  )-----------( 318      ( 25 Jul 2019 07:42 )             35.8     Telemetry: AF

## 2019-07-26 NOTE — PROGRESS NOTE ADULT - NSHPATTENDINGPLANDISCUSS_GEN_ALL_CORE
MED STAFF ,  ,  , SPEECH AND SWALLOW THERAPIST ,OUTPATIENT PCP
MED STAFF ,  , ,
MED STAFF ,  ,SPEECH AND SWALLOW THERAPIST ,OUTPATIENT PCP

## 2019-07-26 NOTE — PROGRESS NOTE ADULT - PROBLEM SELECTOR PLAN 8
Gastrointestinal stress ulcer prophylaxis and DVT prophylaxis administrated

## 2019-07-26 NOTE — PROGRESS NOTE ADULT - PROBLEM SELECTOR PLAN 6
continue current management and present  medications ,tele monitoring

## 2019-07-26 NOTE — DISCHARGE NOTE PROVIDER - HOSPITAL COURSE
89 year old female ,[facility] resident  with hx  of A-fib, HTN, HLD, and CVA presents with weakness the past 2-3 days associated  with onset SOB . Son noticed patient appeared more weak and fatigued than usual 2-3  days ago and she  "didn't walk as much" . Patient reports generalized body aches and increased weakness . Medical staff  of [facility] noticed labored breathing and sent patient to ER for evaluation .on arrival to ER she was HD stable , had SOB ,  denied CP , abd pain or urinary symptoms. no vomiting or diarrhea. Chest xray showed LLL pneumonia Admitted for septic workup and evaluation, send blood and urine cx, serial lactate levels, monitor vitals closely hydration, monitor urine output and renal profile, iv abx initiated -started on zosyn and vancomycin .Seen by pulmonologist Dr Tena . CTA performed -no PE , found to have pericardial effusion and cardiology evaluation requested Admitted  to telemetry unit for monitoring , send 3 sets of cardiac ensymes to rule out coronary event, obtain ECHO to evaluate LVEF, cardiology consult ,continue current management, O2 supply, anticoagulation plan as per cardiology consult Palliative care consult requested ,to discuss advance directives and complete MOLST    ·  Problem: Pneumonia of left lower lobe due to infectious organism 2/2 to ASPIRATION .DYSPHAGIA 2 DIET WITH NCL RECOMMENDED   Plan: ON AUGMENTIN ,ASPIRATION PRECAUTIONS.     ·  Problem: Atrial fibrillation.  Plan: - Continue digoxin 0.125 mg daily    - Started on metoprolol yesterday due to rapid AF. Lower to metoprolol tartrate 25 mg bid.    - Continue aspirin 81 mg daily. Unclear why patient is not on anticoagulation (possibly due to frailty and falls risk) - defer to outside physician.     ·  Problem: Pleural effusion 2/2 to CHRONIC DIASTOLIC CHF ,POA .  Plan: - Continue furosemide 20 mg PO daily    - CT chest with small/moderate bilateral pleural effusions, moderate pericardial effusion    - Echocardiogram with normal/hyperdynamic LV systolic function, moderate pericardial effusion. Incomplete study to evaluate for increased intrapericardial pressures but no obvious chamber collapse.     ·  Problem: Dysphagia.  Plan: aspiration precautions ,speech and swallow eval , MBS.     ·  Problem: Pericardial effusion.  Plan: Admitted  to telemetry unit for monitoring , send 3 sets of cardiac ensymes to rule out coronary event, obtain ECHO to evaluate LVEF, cardiology consult ,continue current management, O2 supply, anticoagulation plan as per cardiology consult.    Problem: Hypertension. Plan: continue current management and present  medications ,tele monitoring    ·  Problem: High cholesterol.  Plan: continue home medications.     ·  Problem: Prophylactic measure.  Plan: Gastrointestinal stress ulcer prophylaxis and DVT prophylaxis administrated.

## 2019-07-26 NOTE — DISCHARGE NOTE PROVIDER - PROVIDER TOKENS
PROVIDER:[TOKEN:[[id number]],FOLLOWUP:[1 week]],PROVIDER:[TOKEN:[[id number]],FOLLOWUP:[1 month]],PROVIDER:[TOKEN:[[id number]],FOLLOWUP:[1 month]]

## 2019-07-26 NOTE — PROGRESS NOTE ADULT - SUBJECTIVE AND OBJECTIVE BOX
Patient was examined Chart reviewed Detailed note will be inserted below after going over latest data Meanwhile please refer to my previous notes for Pulmonary/Critical Care assessment recommendations IVAN RUFF [facility]  [phone]  1925 DOA 2019 DR SUPRIYA PIERRE   ALLERGY   nka      CONTACT    Angel MENDOZA  selv [phone]           Initial evaluation/Pulmonary Critical Care consultation requested on 2019   by Dr Pierre   from Dr Tena   Patient examined chart reviewed    HOSPITAL ADMISSION   PATIENT CAME  FROM (if information available)        TYPE OF VISIT      Subsequent Pulmonary followup     REASON FOR VISIT  PLEASE SEE PROBLEM LIST/ASSESSMENTAND RECOMMENDATIONS     PATIENT IVAN RUFF [facility]  [phone]  1925 DOA 2019 DR SUPRIYA PIERRE                        VITALS/LABS    2019 w 9.9 Hb 11.2 Plt 307  Na 138 K 4 CO2 27 Cr .4   2019 afeb 100 160/70   2019 W 8.9 Hb 11 Plt 318  Na 139 K 2.8 CO2 29 Cr .4     PATIENT IVAN RUFF [facility]  [phone]  1925 DOA 2019 DR SUPRIYA PIERRE                        MEDICATIONS      A fib  ASA 81 ()   Dig .125 ()  Metoprolol 25.2 ()   DVT p   hpsc ()   ABIO  augmentin ()  zosyn (-)  vanco (-)   CAD   Atorvastat 20 ()   CHF VALCULAR   enalapril 2.5 ()   Lasix 20 ()   SUP   pepcid 20 ()     GLOBAL ISSUE/BEST PRACTICE:      PROBLEM: HOB elevation:   y            PROBLEM: Stress ulcer proph:    na                      PROBLEM: VTE prophylaxis:     hpsc ()   PROBLEM: Glycemic control:    na  PROBLEM: Nutrition:  Dys 2 mech soft nectar ()    PROBLEM: Advanced directive: na     PROBLEM: Allergies:  na    REVIEW OF SYMPTOMS     NOTE Changess if any  in ROS and PE are updated in daily HOSPITAL COURSE below      Able to give ROS  Yes     RELIABLE No   CONSTITUTIONAL Weakness Yes  Chills No Vision changes No  ENDOCRINE No unexplained hair loss No heat or cold intolerance    ALLERGY No hives  Sore throat No   RESP Coughing blood no  Shortness of breath YES   NEURO No Headache  Confusion Pain neck No   CARDIAC No Chest pain No Palpitations   GI No Pain abdomen NO   Vomiting NO     PHYSICAL EXAM    HEENT Unremarkable PERRLA atraumatic   RESP Fair air entry EXP prolonged    Harsh breath sound Resp distres mild   CARDIAC S1 S2 No S3     NO JVD    ABDOMEN SOFT BS PRESENT NOT DISTENDED No hepatosplenomegaly PEDAL EDEMA present No calf tenderness  NO rash   GENERAL Not TOXIC

## 2019-07-28 LAB
CULTURE RESULTS: SIGNIFICANT CHANGE UP
CULTURE RESULTS: SIGNIFICANT CHANGE UP
SPECIMEN SOURCE: SIGNIFICANT CHANGE UP
SPECIMEN SOURCE: SIGNIFICANT CHANGE UP

## 2019-12-13 ENCOUNTER — EMERGENCY (EMERGENCY)
Facility: HOSPITAL | Age: 84
LOS: 1 days | Discharge: HOME | End: 2019-12-13
Attending: EMERGENCY MEDICINE | Admitting: EMERGENCY MEDICINE
Payer: MEDICARE

## 2019-12-13 VITALS
OXYGEN SATURATION: 98 % | DIASTOLIC BLOOD PRESSURE: 90 MMHG | RESPIRATION RATE: 18 BRPM | HEART RATE: 89 BPM | SYSTOLIC BLOOD PRESSURE: 172 MMHG

## 2019-12-13 VITALS
TEMPERATURE: 98 F | RESPIRATION RATE: 16 BRPM | HEART RATE: 103 BPM | OXYGEN SATURATION: 99 % | SYSTOLIC BLOOD PRESSURE: 160 MMHG | HEIGHT: 65 IN | DIASTOLIC BLOOD PRESSURE: 102 MMHG | WEIGHT: 160.06 LBS

## 2019-12-13 LAB
ALBUMIN SERPL ELPH-MCNC: 3.6 G/DL — SIGNIFICANT CHANGE UP (ref 3.3–5)
ALP SERPL-CCNC: 161 U/L — HIGH (ref 40–120)
ALT FLD-CCNC: 18 U/L — SIGNIFICANT CHANGE UP (ref 12–78)
ANION GAP SERPL CALC-SCNC: 6 MMOL/L — SIGNIFICANT CHANGE UP (ref 5–17)
APTT BLD: 39.4 SEC — HIGH (ref 28.5–37)
AST SERPL-CCNC: 20 U/L — SIGNIFICANT CHANGE UP (ref 15–37)
BASOPHILS # BLD AUTO: 0.05 K/UL — SIGNIFICANT CHANGE UP (ref 0–0.2)
BASOPHILS NFR BLD AUTO: 0.5 % — SIGNIFICANT CHANGE UP (ref 0–2)
BILIRUB SERPL-MCNC: 0.4 MG/DL — SIGNIFICANT CHANGE UP (ref 0.2–1.2)
BUN SERPL-MCNC: 14 MG/DL — SIGNIFICANT CHANGE UP (ref 7–23)
CALCIUM SERPL-MCNC: 9.2 MG/DL — SIGNIFICANT CHANGE UP (ref 8.5–10.1)
CHLORIDE SERPL-SCNC: 105 MMOL/L — SIGNIFICANT CHANGE UP (ref 96–108)
CO2 SERPL-SCNC: 31 MMOL/L — SIGNIFICANT CHANGE UP (ref 22–31)
CREAT SERPL-MCNC: 0.66 MG/DL — SIGNIFICANT CHANGE UP (ref 0.5–1.3)
EOSINOPHIL # BLD AUTO: 0.06 K/UL — SIGNIFICANT CHANGE UP (ref 0–0.5)
EOSINOPHIL NFR BLD AUTO: 0.6 % — SIGNIFICANT CHANGE UP (ref 0–6)
GLUCOSE SERPL-MCNC: 132 MG/DL — HIGH (ref 70–99)
HCT VFR BLD CALC: 44.1 % — SIGNIFICANT CHANGE UP (ref 34.5–45)
HGB BLD-MCNC: 14.1 G/DL — SIGNIFICANT CHANGE UP (ref 11.5–15.5)
IMM GRANULOCYTES NFR BLD AUTO: 0.6 % — SIGNIFICANT CHANGE UP (ref 0–1.5)
INR BLD: 1.08 RATIO — SIGNIFICANT CHANGE UP (ref 0.88–1.16)
LYMPHOCYTES # BLD AUTO: 2.35 K/UL — SIGNIFICANT CHANGE UP (ref 1–3.3)
LYMPHOCYTES # BLD AUTO: 22.9 % — SIGNIFICANT CHANGE UP (ref 13–44)
MCHC RBC-ENTMCNC: 29.2 PG — SIGNIFICANT CHANGE UP (ref 27–34)
MCHC RBC-ENTMCNC: 32 GM/DL — SIGNIFICANT CHANGE UP (ref 32–36)
MCV RBC AUTO: 91.3 FL — SIGNIFICANT CHANGE UP (ref 80–100)
MONOCYTES # BLD AUTO: 0.82 K/UL — SIGNIFICANT CHANGE UP (ref 0–0.9)
MONOCYTES NFR BLD AUTO: 8 % — SIGNIFICANT CHANGE UP (ref 2–14)
NEUTROPHILS # BLD AUTO: 6.94 K/UL — SIGNIFICANT CHANGE UP (ref 1.8–7.4)
NEUTROPHILS NFR BLD AUTO: 67.4 % — SIGNIFICANT CHANGE UP (ref 43–77)
NRBC # BLD: 0 /100 WBCS — SIGNIFICANT CHANGE UP (ref 0–0)
PLATELET # BLD AUTO: 426 K/UL — HIGH (ref 150–400)
POTASSIUM SERPL-MCNC: 4.4 MMOL/L — SIGNIFICANT CHANGE UP (ref 3.5–5.3)
POTASSIUM SERPL-SCNC: 4.4 MMOL/L — SIGNIFICANT CHANGE UP (ref 3.5–5.3)
PROT SERPL-MCNC: 8.2 G/DL — SIGNIFICANT CHANGE UP (ref 6–8.3)
PROTHROM AB SERPL-ACNC: 12.3 SEC — SIGNIFICANT CHANGE UP (ref 10–12.9)
RBC # BLD: 4.83 M/UL — SIGNIFICANT CHANGE UP (ref 3.8–5.2)
RBC # FLD: 17.2 % — HIGH (ref 10.3–14.5)
SODIUM SERPL-SCNC: 142 MMOL/L — SIGNIFICANT CHANGE UP (ref 135–145)
WBC # BLD: 10.28 K/UL — SIGNIFICANT CHANGE UP (ref 3.8–10.5)
WBC # FLD AUTO: 10.28 K/UL — SIGNIFICANT CHANGE UP (ref 3.8–10.5)

## 2019-12-13 PROCEDURE — 36415 COLL VENOUS BLD VENIPUNCTURE: CPT

## 2019-12-13 PROCEDURE — 85027 COMPLETE CBC AUTOMATED: CPT

## 2019-12-13 PROCEDURE — 85730 THROMBOPLASTIN TIME PARTIAL: CPT

## 2019-12-13 PROCEDURE — 80053 COMPREHEN METABOLIC PANEL: CPT

## 2019-12-13 PROCEDURE — 96375 TX/PRO/DX INJ NEW DRUG ADDON: CPT

## 2019-12-13 PROCEDURE — 70450 CT HEAD/BRAIN W/O DYE: CPT

## 2019-12-13 PROCEDURE — 70450 CT HEAD/BRAIN W/O DYE: CPT | Mod: 26

## 2019-12-13 PROCEDURE — 99285 EMERGENCY DEPT VISIT HI MDM: CPT

## 2019-12-13 PROCEDURE — 85610 PROTHROMBIN TIME: CPT

## 2019-12-13 PROCEDURE — 96374 THER/PROPH/DIAG INJ IV PUSH: CPT

## 2019-12-13 PROCEDURE — 99284 EMERGENCY DEPT VISIT MOD MDM: CPT | Mod: 25

## 2019-12-13 RX ORDER — METOPROLOL TARTRATE 50 MG
25 TABLET ORAL ONCE
Refills: 0 | Status: COMPLETED | OUTPATIENT
Start: 2019-12-13 | End: 2019-12-13

## 2019-12-13 RX ORDER — HYDRALAZINE HCL 50 MG
10 TABLET ORAL ONCE
Refills: 0 | Status: COMPLETED | OUTPATIENT
Start: 2019-12-13 | End: 2019-12-13

## 2019-12-13 RX ORDER — LABETALOL HCL 100 MG
10 TABLET ORAL ONCE
Refills: 0 | Status: COMPLETED | OUTPATIENT
Start: 2019-12-13 | End: 2019-12-13

## 2019-12-13 RX ADMIN — Medication 10 MILLIGRAM(S): at 19:22

## 2019-12-13 RX ADMIN — Medication 25 MILLIGRAM(S): at 16:43

## 2019-12-13 RX ADMIN — Medication 10 MILLIGRAM(S): at 17:55

## 2019-12-13 NOTE — ED ADULT TRIAGE NOTE - CHIEF COMPLAINT QUOTE
pt avril from [facility] for episode of blurred vision and confusion that has since resolved. sent to r/o tia

## 2019-12-13 NOTE — ED PROVIDER NOTE - PATIENT PORTAL LINK FT
You can access the FollowMyHealth Patient Portal offered by [facility] by registering at the following website: [email or web address]. By joining [facility]’s FollowMyHealth portal, you will also be able to view your health information using other applications (apps) compatible with our system.

## 2019-12-13 NOTE — ED PROVIDER NOTE - NSFOLLOWUPINSTRUCTIONS_ED_ALL_ED_FT
1) Follow-up with your Primary Medical Doctor. Call today / next business day for prompt follow-up.  2) Return to Emergency room for any worsening or persistent pain, weakness, fever, or any other concerning symptoms.  3) See attached instruction sheets for additional information, including information regarding signs and symptoms to look out for, reasons to seek immediate care and other important instructions.  4) As recommended by neurologist, discontinue aspirin, start on plavix 75mg once a day.

## 2019-12-13 NOTE — ED ADULT NURSE NOTE - CHIEF COMPLAINT QUOTE
pt avril from [facility] for episode of blurred vision and confusion that has since resolved. sent to r/o tia

## 2019-12-13 NOTE — ED PROVIDER NOTE - PHYSICAL EXAMINATION
Gen: AAOx2  	Head/eyes: NC/AT, PERRL, EOMI  	ENT: airway patent  	Neck: supple, no tenderness/meningismus/JVD, Trachea midline  	Pulm/lung: Bilateral clear BS, normal resp effort, no wheeze/stridor/retractions  	CV/heart: RRR, no M/R/G, +2 dist pulses (radial, pedal DP/PT, popliteal)  	GI/Abd: soft, NT/ND, +BS, no guarding/rebound tenderness  	Musculoskeletal: no edema/erythema/cyanosis, FROM in all extremities, no C/T/L spine ttp  	Skin: no rash, no vesicles, no petechaie, no ecchymosis, no swelling                Neuro: AAOx2, CN 2-12 intact, normal sensation, 5/5 motor strength in all extremities, no dysmetria, NIHSS = 0

## 2019-12-13 NOTE — ED ADULT NURSE NOTE - OBJECTIVE STATEMENT
Presents to ER with blurred vision.  Pt has dementia and is currently confused.  GCS=14, speech clear.  Pending head CT.  Pt denies any blurred vision at present.

## 2019-12-13 NOTE — ED PROVIDER NOTE - OBJECTIVE STATEMENT
84 yo female hx of dementia, high cholesterol, ICH sent from [place] as per EMS episode of "blurred vision" and increased confusion and now back to baseline.  Patient is AAOx2, no focal weakness 84 yo female hx of dementia, high cholesterol, ICH sent from [place] as per EMS episode of "blurred vision" and increased confusion and now back to baseline.  Patient is AAOx2, no focal weakness.  Was noted to have BP in 200's at facility.

## 2019-12-13 NOTE — ED ADULT NURSE NOTE - PMH
Atrial fibrillation    Constipation    HLD (hyperlipidemia)    HTN (hypertension)    ICH (intracerebral hemorrhage)

## 2019-12-14 PROBLEM — I48.91 UNSPECIFIED ATRIAL FIBRILLATION: Chronic | Status: ACTIVE | Noted: 2019-07-22

## 2019-12-14 PROBLEM — E78.00 PURE HYPERCHOLESTEROLEMIA, UNSPECIFIED: Chronic | Status: ACTIVE | Noted: 2019-07-22

## 2019-12-14 PROBLEM — I10 ESSENTIAL (PRIMARY) HYPERTENSION: Chronic | Status: ACTIVE | Noted: 2019-07-22

## 2019-12-14 PROBLEM — I63.9 CEREBRAL INFARCTION, UNSPECIFIED: Chronic | Status: ACTIVE | Noted: 2019-07-23

## 2019-12-14 RX ORDER — DOCUSATE SODIUM 100 MG
1 CAPSULE ORAL
Qty: 0 | Refills: 0 | DISCHARGE

## 2019-12-14 RX ORDER — FUROSEMIDE 40 MG
1 TABLET ORAL
Qty: 0 | Refills: 0 | DISCHARGE

## 2019-12-14 RX ORDER — SENNA PLUS 8.6 MG/1
1 TABLET ORAL
Qty: 0 | Refills: 0 | DISCHARGE

## 2019-12-14 RX ORDER — DIGOXIN 250 MCG
1 TABLET ORAL
Qty: 0 | Refills: 0 | DISCHARGE

## 2019-12-14 RX ORDER — METOPROLOL TARTRATE 50 MG
25 TABLET ORAL
Qty: 0 | Refills: 0 | DISCHARGE

## 2019-12-14 RX ORDER — ATORVASTATIN CALCIUM 80 MG/1
1 TABLET, FILM COATED ORAL
Qty: 0 | Refills: 0 | DISCHARGE

## 2019-12-14 RX ORDER — ASPIRIN/CALCIUM CARB/MAGNESIUM 324 MG
1 TABLET ORAL
Qty: 0 | Refills: 0 | DISCHARGE

## 2020-01-28 ENCOUNTER — INPATIENT (INPATIENT)
Facility: HOSPITAL | Age: 85
LOS: 1 days | Discharge: NURSING, REHAB OR LONG-TERM CARE | DRG: 64 | End: 2020-01-30
Attending: INTERNAL MEDICINE | Admitting: FAMILY MEDICINE
Payer: MEDICARE

## 2020-01-28 ENCOUNTER — EMERGENCY (EMERGENCY)
Facility: HOSPITAL | Age: 85
LOS: 1 days | Discharge: HOME | End: 2020-01-28
Attending: EMERGENCY MEDICINE | Admitting: EMERGENCY MEDICINE
Payer: MEDICARE

## 2020-01-28 VITALS
RESPIRATION RATE: 14 BRPM | WEIGHT: 145.06 LBS | OXYGEN SATURATION: 99 % | DIASTOLIC BLOOD PRESSURE: 50 MMHG | HEIGHT: 67 IN | SYSTOLIC BLOOD PRESSURE: 116 MMHG | TEMPERATURE: 98 F | HEART RATE: 70 BPM

## 2020-01-28 VITALS
HEIGHT: 69 IN | HEART RATE: 81 BPM | RESPIRATION RATE: 16 BRPM | OXYGEN SATURATION: 98 % | SYSTOLIC BLOOD PRESSURE: 102 MMHG | WEIGHT: 139.77 LBS | DIASTOLIC BLOOD PRESSURE: 58 MMHG

## 2020-01-28 DIAGNOSIS — I63.9 CEREBRAL INFARCTION, UNSPECIFIED: ICD-10-CM
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
## 2020-01-28 DIAGNOSIS — Z96.642 PRESENCE OF LEFT ARTIFICIAL HIP JOINT: Chronic | ICD-10-CM

## 2020-01-28 DIAGNOSIS — R13.10 DYSPHAGIA, UNSPECIFIED: ICD-10-CM

## 2020-01-28 DIAGNOSIS — R58 HEMORRHAGE, NOT ELSEWHERE CLASSIFIED: ICD-10-CM

## 2020-01-28 DIAGNOSIS — R47.1 DYSARTHRIA AND ANARTHRIA: ICD-10-CM

## 2020-01-28 DIAGNOSIS — R01.1 CARDIAC MURMUR, UNSPECIFIED: ICD-10-CM

## 2020-01-28 DIAGNOSIS — I10 ESSENTIAL (PRIMARY) HYPERTENSION: ICD-10-CM

## 2020-01-28 DIAGNOSIS — I48.91 UNSPECIFIED ATRIAL FIBRILLATION: ICD-10-CM

## 2020-01-28 DIAGNOSIS — I48.20 CHRONIC ATRIAL FIBRILLATION, UNSPECIFIED: ICD-10-CM

## 2020-01-28 DIAGNOSIS — Z79.82 LONG TERM (CURRENT) USE OF ASPIRIN: ICD-10-CM

## 2020-01-28 PROBLEM — I61.9 NONTRAUMATIC INTRACEREBRAL HEMORRHAGE, UNSPECIFIED: Chronic | Status: ACTIVE | Noted: 2019-12-13

## 2020-01-28 PROBLEM — K59.00 CONSTIPATION, UNSPECIFIED: Chronic | Status: ACTIVE | Noted: 2019-12-13

## 2020-01-28 PROBLEM — E78.5 HYPERLIPIDEMIA, UNSPECIFIED: Chronic | Status: ACTIVE | Noted: 2019-12-13

## 2020-01-28 LAB
ALBUMIN SERPL ELPH-MCNC: 3.1 G/DL — LOW (ref 3.3–5)
ALBUMIN SERPL ELPH-MCNC: 3.3 G/DL — SIGNIFICANT CHANGE UP (ref 3.3–5.2)
ALLERGY+IMMUNOLOGY DIAG STUDY NOTE: SIGNIFICANT CHANGE UP
ALLERGY+IMMUNOLOGY DIAG STUDY NOTE: SIGNIFICANT CHANGE UP
ALP SERPL-CCNC: 131 U/L — HIGH (ref 40–120)
ALP SERPL-CCNC: 147 U/L — HIGH (ref 40–120)
ALT FLD-CCNC: 17 U/L — SIGNIFICANT CHANGE UP
ALT FLD-CCNC: 24 U/L — SIGNIFICANT CHANGE UP (ref 12–78)
ANION GAP SERPL CALC-SCNC: 11 MMOL/L — SIGNIFICANT CHANGE UP (ref 5–17)
ANION GAP SERPL CALC-SCNC: 5 MMOL/L — SIGNIFICANT CHANGE UP (ref 5–17)
APTT BLD: 41.6 SEC — HIGH (ref 27.5–36.3)
APTT BLD: 43.1 SEC — HIGH (ref 28.5–37)
AST SERPL-CCNC: 24 U/L — SIGNIFICANT CHANGE UP (ref 15–37)
AST SERPL-CCNC: 26 U/L — SIGNIFICANT CHANGE UP
BASOPHILS # BLD AUTO: 0.03 K/UL — SIGNIFICANT CHANGE UP (ref 0–0.2)
BASOPHILS # BLD AUTO: 0.03 K/UL — SIGNIFICANT CHANGE UP (ref 0–0.2)
BASOPHILS NFR BLD AUTO: 0.2 % — SIGNIFICANT CHANGE UP (ref 0–2)
BASOPHILS NFR BLD AUTO: 0.3 % — SIGNIFICANT CHANGE UP (ref 0–2)
BILIRUB SERPL-MCNC: 0.8 MG/DL — SIGNIFICANT CHANGE UP (ref 0.4–2)
BILIRUB SERPL-MCNC: 0.9 MG/DL — SIGNIFICANT CHANGE UP (ref 0.2–1.2)
BLD GP AB SCN SERPL QL: SIGNIFICANT CHANGE UP
BLD GP AB SCN SERPL QL: SIGNIFICANT CHANGE UP
BUN SERPL-MCNC: 21 MG/DL — HIGH (ref 8–20)
BUN SERPL-MCNC: 22 MG/DL — SIGNIFICANT CHANGE UP (ref 7–23)
CALCIUM SERPL-MCNC: 8.8 MG/DL — SIGNIFICANT CHANGE UP (ref 8.5–10.1)
CALCIUM SERPL-MCNC: 8.8 MG/DL — SIGNIFICANT CHANGE UP (ref 8.6–10.2)
CHLORIDE SERPL-SCNC: 103 MMOL/L — SIGNIFICANT CHANGE UP (ref 96–108)
CHLORIDE SERPL-SCNC: 97 MMOL/L — LOW (ref 98–107)
CO2 SERPL-SCNC: 28 MMOL/L — SIGNIFICANT CHANGE UP (ref 22–29)
CO2 SERPL-SCNC: 32 MMOL/L — HIGH (ref 22–31)
CREAT SERPL-MCNC: 0.38 MG/DL — LOW (ref 0.5–1.3)
CREAT SERPL-MCNC: 0.49 MG/DL — LOW (ref 0.5–1.3)
DAT IGG-SP REAG RBC-IMP: SIGNIFICANT CHANGE UP
DAT IGG-SP REAG RBC-IMP: SIGNIFICANT CHANGE UP
DIR ANTIGLOB POLYSPECIFIC INTERPRETATION: ABNORMAL
DIR ANTIGLOB POLYSPECIFIC INTERPRETATION: ABNORMAL
EOSINOPHIL # BLD AUTO: 0.05 K/UL — SIGNIFICANT CHANGE UP (ref 0–0.5)
EOSINOPHIL # BLD AUTO: 0.07 K/UL — SIGNIFICANT CHANGE UP (ref 0–0.5)
EOSINOPHIL NFR BLD AUTO: 0.4 % — SIGNIFICANT CHANGE UP (ref 0–6)
EOSINOPHIL NFR BLD AUTO: 0.6 % — SIGNIFICANT CHANGE UP (ref 0–6)
GLUCOSE SERPL-MCNC: 87 MG/DL — SIGNIFICANT CHANGE UP (ref 70–99)
GLUCOSE SERPL-MCNC: 89 MG/DL — SIGNIFICANT CHANGE UP (ref 70–99)
HCT VFR BLD CALC: 35.7 % — SIGNIFICANT CHANGE UP (ref 34.5–45)
HCT VFR BLD CALC: 39.6 % — SIGNIFICANT CHANGE UP (ref 34.5–45)
HGB BLD-MCNC: 12.5 G/DL — SIGNIFICANT CHANGE UP (ref 11.5–15.5)
HGB BLD-MCNC: 13 G/DL — SIGNIFICANT CHANGE UP (ref 11.5–15.5)
IAT COMP-SP REAG SERPL QL: ABNORMAL
IAT COMP-SP REAG SERPL QL: ABNORMAL
IMM GRANULOCYTES NFR BLD AUTO: 0.5 % — SIGNIFICANT CHANGE UP (ref 0–1.5)
IMM GRANULOCYTES NFR BLD AUTO: 0.5 % — SIGNIFICANT CHANGE UP (ref 0–1.5)
INR BLD: 1.2 RATIO — HIGH (ref 0.88–1.16)
INR BLD: 1.24 RATIO — HIGH (ref 0.88–1.16)
LYMPHOCYTES # BLD AUTO: 1.52 K/UL — SIGNIFICANT CHANGE UP (ref 1–3.3)
LYMPHOCYTES # BLD AUTO: 1.97 K/UL — SIGNIFICANT CHANGE UP (ref 1–3.3)
LYMPHOCYTES # BLD AUTO: 11.8 % — LOW (ref 13–44)
LYMPHOCYTES # BLD AUTO: 16.5 % — SIGNIFICANT CHANGE UP (ref 13–44)
MCHC RBC-ENTMCNC: 29.7 PG — SIGNIFICANT CHANGE UP (ref 27–34)
MCHC RBC-ENTMCNC: 32.8 GM/DL — SIGNIFICANT CHANGE UP (ref 32–36)
MCHC RBC-ENTMCNC: 33.9 PG — SIGNIFICANT CHANGE UP (ref 27–34)
MCHC RBC-ENTMCNC: 35 GM/DL — SIGNIFICANT CHANGE UP (ref 32–36)
MCV RBC AUTO: 90.4 FL — SIGNIFICANT CHANGE UP (ref 80–100)
MCV RBC AUTO: 96.7 FL — SIGNIFICANT CHANGE UP (ref 80–100)
MONOCYTES # BLD AUTO: 0.91 K/UL — HIGH (ref 0–0.9)
MONOCYTES # BLD AUTO: 0.92 K/UL — HIGH (ref 0–0.9)
MONOCYTES NFR BLD AUTO: 7.2 % — SIGNIFICANT CHANGE UP (ref 2–14)
MONOCYTES NFR BLD AUTO: 7.6 % — SIGNIFICANT CHANGE UP (ref 2–14)
NEUTROPHILS # BLD AUTO: 10.25 K/UL — HIGH (ref 1.8–7.4)
NEUTROPHILS # BLD AUTO: 8.89 K/UL — HIGH (ref 1.8–7.4)
NEUTROPHILS NFR BLD AUTO: 74.5 % — SIGNIFICANT CHANGE UP (ref 43–77)
NEUTROPHILS NFR BLD AUTO: 79.9 % — HIGH (ref 43–77)
NRBC # BLD: 0 /100 WBCS — SIGNIFICANT CHANGE UP (ref 0–0)
PLATELET # BLD AUTO: 433 K/UL — HIGH (ref 150–400)
PLATELET # BLD AUTO: 464 K/UL — HIGH (ref 150–400)
POTASSIUM SERPL-MCNC: 3 MMOL/L — LOW (ref 3.5–5.3)
POTASSIUM SERPL-MCNC: 3.7 MMOL/L — SIGNIFICANT CHANGE UP (ref 3.5–5.3)
POTASSIUM SERPL-SCNC: 3 MMOL/L — LOW (ref 3.5–5.3)
POTASSIUM SERPL-SCNC: 3.7 MMOL/L — SIGNIFICANT CHANGE UP (ref 3.5–5.3)
PROT SERPL-MCNC: 6.7 G/DL — SIGNIFICANT CHANGE UP (ref 6.6–8.7)
PROT SERPL-MCNC: 7.3 G/DL — SIGNIFICANT CHANGE UP (ref 6–8.3)
PROTHROM AB SERPL-ACNC: 13.5 SEC — HIGH (ref 10–12.9)
PROTHROM AB SERPL-ACNC: 14.4 SEC — HIGH (ref 10–12.9)
RBC # BLD: 3.69 M/UL — LOW (ref 3.8–5.2)
RBC # BLD: 4.38 M/UL — SIGNIFICANT CHANGE UP (ref 3.8–5.2)
RBC # FLD: 14.8 % — HIGH (ref 10.3–14.5)
RBC # FLD: 17.1 % — HIGH (ref 10.3–14.5)
SODIUM SERPL-SCNC: 136 MMOL/L — SIGNIFICANT CHANGE UP (ref 135–145)
SODIUM SERPL-SCNC: 140 MMOL/L — SIGNIFICANT CHANGE UP (ref 135–145)
WBC # BLD: 11.93 K/UL — HIGH (ref 3.8–10.5)
WBC # BLD: 12.84 K/UL — HIGH (ref 3.8–10.5)
WBC # FLD AUTO: 11.93 K/UL — HIGH (ref 3.8–10.5)
WBC # FLD AUTO: 12.84 K/UL — HIGH (ref 3.8–10.5)

## 2020-01-28 PROCEDURE — 99223 1ST HOSP IP/OBS HIGH 75: CPT

## 2020-01-28 PROCEDURE — 70450 CT HEAD/BRAIN W/O DYE: CPT | Mod: 26

## 2020-01-28 PROCEDURE — 99291 CRITICAL CARE FIRST HOUR: CPT

## 2020-01-28 PROCEDURE — 86077 PHYS BLOOD BANK SERV XMATCH: CPT

## 2020-01-28 PROCEDURE — 70486 CT MAXILLOFACIAL W/O DYE: CPT | Mod: 26

## 2020-01-28 PROCEDURE — 93010 ELECTROCARDIOGRAM REPORT: CPT

## 2020-01-28 PROCEDURE — 72125 CT NECK SPINE W/O DYE: CPT | Mod: 26

## 2020-01-28 PROCEDURE — 99285 EMERGENCY DEPT VISIT HI MDM: CPT

## 2020-01-28 PROCEDURE — 71045 X-RAY EXAM CHEST 1 VIEW: CPT | Mod: 26

## 2020-01-28 PROCEDURE — 99284 EMERGENCY DEPT VISIT MOD MDM: CPT

## 2020-01-28 PROCEDURE — 99221 1ST HOSP IP/OBS SF/LOW 40: CPT

## 2020-01-28 PROCEDURE — 99233 SBSQ HOSP IP/OBS HIGH 50: CPT

## 2020-01-28 PROCEDURE — 93880 EXTRACRANIAL BILAT STUDY: CPT | Mod: 26

## 2020-01-28 RX ORDER — ASPIRIN/CALCIUM CARB/MAGNESIUM 324 MG
300 TABLET ORAL DAILY
Refills: 0 | Status: DISCONTINUED | OUTPATIENT
Start: 2020-01-28 | End: 2020-01-28

## 2020-01-28 RX ORDER — ATORVASTATIN CALCIUM 80 MG/1
80 TABLET, FILM COATED ORAL AT BEDTIME
Refills: 0 | Status: DISCONTINUED | OUTPATIENT
Start: 2020-01-28 | End: 2020-01-30

## 2020-01-28 RX ORDER — SENNA PLUS 8.6 MG/1
1 TABLET ORAL AT BEDTIME
Refills: 0 | Status: DISCONTINUED | OUTPATIENT
Start: 2020-01-28 | End: 2020-01-30

## 2020-01-28 RX ORDER — METOPROLOL TARTRATE 50 MG
25 TABLET ORAL
Refills: 0 | Status: DISCONTINUED | OUTPATIENT
Start: 2020-01-28 | End: 2020-01-30

## 2020-01-28 RX ORDER — LEVETIRACETAM 250 MG/1
1000 TABLET, FILM COATED ORAL ONCE
Refills: 0 | Status: COMPLETED | OUTPATIENT
Start: 2020-01-28 | End: 2020-01-28

## 2020-01-28 RX ORDER — SODIUM CHLORIDE 9 MG/ML
1000 INJECTION INTRAMUSCULAR; INTRAVENOUS; SUBCUTANEOUS ONCE
Refills: 0 | Status: COMPLETED | OUTPATIENT
Start: 2020-01-28 | End: 2020-01-28

## 2020-01-28 RX ORDER — DIGOXIN 250 MCG
0.12 TABLET ORAL DAILY
Refills: 0 | Status: DISCONTINUED | OUTPATIENT
Start: 2020-01-29 | End: 2020-01-30

## 2020-01-28 RX ORDER — FUROSEMIDE 40 MG
20 TABLET ORAL DAILY
Refills: 0 | Status: DISCONTINUED | OUTPATIENT
Start: 2020-01-28 | End: 2020-01-28

## 2020-01-28 RX ORDER — METOPROLOL TARTRATE 50 MG
25 TABLET ORAL
Refills: 0 | Status: DISCONTINUED | OUTPATIENT
Start: 2020-01-28 | End: 2020-01-28

## 2020-01-28 RX ADMIN — LEVETIRACETAM 440 MILLIGRAM(S): 250 TABLET, FILM COATED ORAL at 13:39

## 2020-01-28 RX ADMIN — SODIUM CHLORIDE 1000 MILLILITER(S): 9 INJECTION INTRAMUSCULAR; INTRAVENOUS; SUBCUTANEOUS at 15:32

## 2020-01-28 RX ADMIN — SENNA PLUS 1 TABLET(S): 8.6 TABLET ORAL at 22:33

## 2020-01-28 RX ADMIN — ATORVASTATIN CALCIUM 80 MILLIGRAM(S): 80 TABLET, FILM COATED ORAL at 22:33

## 2020-01-28 NOTE — H&P ADULT - PROBLEM SELECTOR PLAN 1
rt. occipital cva with hemorrhagic conversion, stroke protocol, neurochecks q 2hrs, neuro surgery on board and in agreement, no asa, plavix, or lovenox for dvt prophylaxis, compression boots. pt's son signed dnr /dni and wants no aggressive measures, neuro surgery, neurology dr. lewis follow up, repeat ct head if neuro surgery recommends.

## 2020-01-28 NOTE — ED PROVIDER NOTE - OBJECTIVE STATEMENT
89 yo F PMHx Afib (not on AC), HTH, HLD, ICH, dementia presents to ED from [facility] for evaluation of fall out of bed last night. As per staff, fall itself was unwitnessed, but came to pts aide immediately after fall- found face first on ground, alert.  Pt with dementia hx, offers no further details- states she's asymptomatic.

## 2020-01-28 NOTE — ED ADULT NURSE NOTE - PMH
Atrial fibrillation    Atrial fibrillation    Cerebrovascular accident (CVA)    Constipation    High cholesterol    HLD (hyperlipidemia)    HTN (hypertension)    Hypertension    ICH (intracerebral hemorrhage)

## 2020-01-28 NOTE — ED PROVIDER NOTE - CLINICAL SUMMARY MEDICAL DECISION MAKING FREE TEXT BOX
89 year old transferred from [facility] for ICH.  Trauma initially called due to reports of fall/trauma to face.  CT shows hemorrhagic conversion of CVA.  Patient downgraded from ICU care with family consent with ICU team.  Patient admitted.

## 2020-01-28 NOTE — ED PROVIDER NOTE - OBJECTIVE STATEMENT
This patient is a 89 year old woman transferred from [facility] for ICH s/p fall.  As per report given to charge nurse patient was found on ground with facial trauma and had CT scan that showed hemorrhage.  Trauma team called on arrival.  Patient has no complaints. This patient is a 89 year old woman hx of dementia transferred from [facility] for ICH s/p fall.  As per report given to charge nurse patient was found on ground with facial trauma and had CT scan that showed hemorrhage.  Trauma team called on arrival.  Patient has no complaints.

## 2020-01-28 NOTE — ED PROVIDER NOTE - ATTENDING CONTRIBUTION TO CARE
I have personally performed a face to face diagnostic evaluation on this patient.  I have reviewed the PA note and agree with the history, exam, and plan of care, except as noted.  History and Exam by me shows patient sent from assisted living for evaluation of trauma to head, patient had been found on the ground this morning, likley fell overnight, patient has a history of dementia, poor historian, provides no information, patient awake, follows commands, knows name and date of birth, states she has no pain, patient right periorbital area swelling, eccymosis, no laceration, EOMI, PERRL, able to move all extremities, unable to assess walking, able to count fingers, lungs clear, abdomen soft, f/u labs, ekg, ct head, maxillofacial, c-spine.

## 2020-01-28 NOTE — CONSULT NOTE ADULT - ASSESSMENT
MICU admit   case d/w Dr. Krishnan and images reviewed   trauma cleared collar and signed off   no immediate NSx intervention indicated at this time  neuro-checks q1 HR and ICU admit  repeat CTB w/o contrast in AM   HOB > 30 degrees  hold all AC/AP  ASA and chemical DVT PPX at this time   goal SBP < 160mmHg + right occipital CVA w hemorrhagic conversion   s/p fall w head injury last night while on ASA 81 for prior CVA/ AFIB     case d/w Dr. Krishnan and images reviewed   trauma cleared collar and signed off   no immediate NSx intervention indicated at this time  son Rl and Bennie at bedside and all images/plan reviewed and all questions answered   per d/w Dr Jackson - no aggressive measures, DNR/I at this time   MICU downgraded to SDU, d/w Dr Solorio   based on family wises and pt's code status, neuro-checks q2 HRs  repeat CTB w/o contrast in AM   HOB > 30 degrees  hold all AC/AP  ASA and chemical DVT PPX at this time   goal SBP < 160mmHg  further plan and management as per primary team

## 2020-01-28 NOTE — ED ADULT NURSE NOTE - CHIEF COMPLAINT QUOTE
Patient transfer from [place] s/p fall, Dx with intercranial hemorrhage. Patient has ecchymosis to right side of face/eye. Trauma B activation upon arrival to Emergency Department. Please refer to paper record.

## 2020-01-28 NOTE — CONSULT NOTE ADULT - SUBJECTIVE AND OBJECTIVE BOX
89y Female BIB BLS/ALS for   Patient denies fevers/chills, denies lightheadedness/dizziness, denies SOB/chest pain, denies nausea/vomiting, denies constipation/diarrhea.  ***    A airway intact, C collar placed, speaking full sentences  B equal breath sounds bilaterally  C radial/DP/femoral pulses intact bilaterally   D GCS15 E4V5M6, moving all fours, no focal deficits, pupils R 3mm reactive/L 3mm reactive  E patient fully exposed, no gross deformities or bleeding, provided warm blankets    CXR no fracture or hemopneumothorax  FAST negative    Initial vitals:     Secondary survey remarkable for ***    ROS: 10-system review is otherwise negative except HPI above.      PAST MEDICAL & SURGICAL HISTORY:    FAMILY HISTORY:    [] Family history not pertinent as reviewed with the patient and family    SOCIAL HISTORY:  ***    ALLERGIES: Allergy Status Unknown      HOME MEDICATIONS: ***    --------------------------------------------------------------------------------------------    PHYSICAL EXAM: ***  General: NAD, Lying in bed comfortably  Neuro: A+Ox3  HEENT: NC/AT, EOMI  Neck: Soft, supple  Cardio: RRR, nml S1/S2  Resp: Good effort, CTA b/l  Thorax: No chest wall tenderness  Breast: No lesions/masses, no drainage  GI/Abd: Soft, NT/ND, no rebound/guarding, no masses palpated  Vascular: All 4 extremities warm.  Skin: Intact, no breakdown  Lymphatic/Nodes: No palpable lymphadenopathy  Pelvis: stable  Musculoskeletal: All 4 extremities moving spontaneously, no limitations, no spinal tenderness or stepoffs  --------------------------------------------------------------------------------------------    LABS PENDING       --------------------------------------------------------------------------------------------  IMAGING  CT head:  CT C-spine:  CXR:   CT C/A/P:  CT T-spine:  CT L-spine:    ASSESSMENT: Patient is a 89y old female s/p ****    PLAN:    - C collar  - f/u ortho  - f/u NSx  - NPO  - IVF  - pain control  - DVT ppx  - strict bedrest/OOB/ambulate  - strict I/Os  - Patient seen/examined with attending.  - Plan to be discussed with Attending,  89y Female BIB BLS/ALS after a stroke and ?fall at her senior citizen living facility - she was found to have a hemorrhagic stroke on imaging and [facility] called Neurosurgery at [place] to transfer the patient for Neurosurgical care.  Patient Alert only to self - no complaints.    A airway intact, C collar placed, speaking full sentences  B equal breath sounds bilaterally  C radial/DP/femoral pulses intact bilaterally   D GCS14 E4V4M6, moving all fours, no focal deficits, pupils R 3mm reactive/L 3mm slowly reactive  E patient fully exposed, no gross deformities or bleeding, provided warm blankets    CXR PENDING    Secondary survey remarkable for ABRASIONS ON POSTERIOR LEs, RIGHT-SIDED SANJAY-ORBITAL ECCHYMOSES    ROS: 10-system review is otherwise negative except HPI above.      PAST MEDICAL & SURGICAL HISTORY:  Atrial fibrillation    SOCIAL HISTORY:  Retired, lives at an assisted living facility     ALLERGIES: Allergy Status Unknown    HOME MEDICATIONS: See Medication reconciliation    --------------------------------------------------------------------------------------------    PHYSICAL EXAM:   General: NAD, Lying in bed comfortably  Neuro: A+Ox3  HEENT: Right-sided sanjay-orbital ecchymosis; EOMI  Neck: Soft, supple  Cardio: Regular rate   Resp: Good effort, CTA b/l  Thorax: No chest wall tenderness  Breast: No lesions/masses, no drainage  GI/Abd: Soft, NT/ND, no rebound/guarding, no masses palpated  Vascular: All 4 extremities warm.  Skin: Intact, no breakdown  Lymphatic/Nodes: No palpable lymphadenopathy  Pelvis: stable  Musculoskeletal: All 4 extremities moving spontaneously, no limitations, no spinal tenderness or stepoffs  --------------------------------------------------------------------------------------------    LABS PENDING    --------------------------------------------------------------------------------------------  IMAGING  CT head:   CT C-spine:  CXR: PENDING official read    ASSESSMENT: Patient is a 89y old female s/p stroke and ?fall at her senior citizen living facility. Transferred to [facility] from [facility] for Neurosurgical care after a stroke.  Found to have had a hemorrhagic stroke and not a traumatic injury.    PLAN:      Trauma B cancelled - injury ruled to be non-traumatic - a hemorrhagic stroke, confirmed by imaging.  Cleared from a traumatic perspective.    - Full and final plan per ED Physicians  - C collar  - f/u NSx  - NPO  - IVF  - pain control PRN  - strict bedrest  - strict I/Os  - Patient seen/examined with attending Dr. Weiss

## 2020-01-28 NOTE — ED ADULT TRIAGE NOTE - CHIEF COMPLAINT QUOTE
Patient transfer from [place] s/p fall, Dx with intercranial hemorrhage. Patient has ecchymosis to right side of face/eye. Trauma B activation upon arrival to Emergency Department. Please refer to paper record.

## 2020-01-28 NOTE — CONSULT NOTE ADULT - SUBJECTIVE AND OBJECTIVE BOX
RADIOLOGY & ADDITIONAL STUDIES:  at [facility]  CTH: right occipital CVA w heme conversion/ reviewed w Dr Ramirez       Time Spent with patient/ education on the floor & arranging care: > 55 mins Patient is a 89y old  Female who presents with a chief complaint of stroke with hemorrhagic conversion (28 Jan 2020 16:29)      HPI:  TX FROM [facility] FOR HEME CONVERSION, DNR/I PER D/W SON BY ME AND MICU/ DR GARZA  PT HAD HYPERTENSIVE CRISIS W SBP ~ 300s WHERE WAS SEEN BY NEURO AND TOLD SHE HAD CVA/ SON OPTED OUT OF MRI/ FURTHER W/U  NEARLY BLIND DUE TO CATARACT/ CVAs, WHEELCHAIR BOUND/ QUIT PT 2 MONTHS AGO AFTER HIP ORIF  ?LOC   + trauma/ HIT THE RIGHT FOREHEAD   denied Headache   Nausea / Vomiting  Right hand dominant   denies new/ worsening weakness  denies new/ worsening paresthesias/ numbness   denies  new/ worsening visual changes  denies C- Spine pain, - collar/ cleared by trauma   denies T/LS  Spine pain  denies Bowel/ Bladder dysfunction  AT BASELINE per family      PAST MEDICAL & SURGICAL HISTORY:  Dysphagia  HLD (hyperlipidemia)  Pleural effusion  HTN (hypertension)  Afib  Pneumonia  History of total hip replacement, left: 2007      SOCIAL HISTORY:  - EtOH, - tobacco,  - drugs    FAMILY HISTORY:  No pertinent family history in first degree relatives   non-pertinent at this time        MEDICATIONS  (STANDING):  atorvastatin 80 milliGRAM(s) Oral at bedtime  furosemide    Tablet 20 milliGRAM(s) Oral daily  metoprolol tartrate 25 milliGRAM(s) Oral two times a day  senna 1 Tablet(s) Oral at bedtime    MEDICATIONS  (PRN):  Allergies  Intolerances      Vital Signs Last 24 Hrs  T(C): 37 (28 Jan 2020 15:31), Max: 37 (28 Jan 2020 15:31)  T(F): 98.6 (28 Jan 2020 15:31), Max: 98.6 (28 Jan 2020 15:31)  HR: 80 (28 Jan 2020 16:11) (80 - 87)  BP: 104/52 (28 Jan 2020 16:11) (102/58 - 111/60)  BP(mean): --  RR: 16 (28 Jan 2020 16:11) (16 - 16)  SpO2: 98% (28 Jan 2020 16:11) (98% - 98%)        PHYSICAL EXAM:  GENERAL: NAD, well-groomed, well-developed, AAOx3 and very cooperative  HEAD:  +traumatic, Normocephalic, no palpable step-off appreciated on palpation  EYES: b/l EOMI, REACTIVE PUPILS B/L/ 4MM B/L, conjunctiva and sclera clear, DOES NOT GUARDS TO THREAT   NECK: Supple, nontender to palpation   TS/LS: nontender to palpation midline or paraspinal muscles b/l,   NERVOUS SYSTEM: Alert & Oriented  TO SELF ONLY, speech is clear and fluent, no dysarthria appreciated. Good concentration & very cooperative; Motor Strength 5/5 B/L upper and lower extremities W SOME LROM LLE 2* HIP ORIF, sensory is at baseline and symmetric b/l; No pronators or ankle clonus appreciated b/l, FS/TML   EXTREMITIES:  2+ Peripheral Pulses, No clubbing, cyanosis, or edema  SKIN: No rashes or lesions, actinic keratosis                           12.5   11.93 )-----------( 433      ( 28 Jan 2020 14:38 )             35.7     01-28    136  |  97<L>  |  21.0<H>  ----------------------------<  89  3.7   |  28.0  |  0.38<L>    Ca    8.8      28 Jan 2020 14:38    TPro  6.7  /  Alb  3.3  /  TBili  0.8  /  DBili  x   /  AST  26  /  ALT  17  /  AlkPhos  131<H>  01-28    PT/INR - ( 28 Jan 2020 14:38 )   PT: 14.4 sec;   INR: 1.24 ratio         PTT - ( 28 Jan 2020 14:38 )  PTT:41.6 sec    LIVER FUNCTIONS - ( 28 Jan 2020 14:38 )  Alb: 3.3 g/dL / Pro: 6.7 g/dL / ALK PHOS: 131 U/L / ALT: 17 U/L / AST: 26 U/L / GGT: x               RADIOLOGY & ADDITIONAL STUDIES:  at [facility]  CTH: right occipital CVA w heme conversion/ reviewed w Dr Ramirez       Time Spent with patient/ education on the floor & arranging care: > 55 mins

## 2020-01-28 NOTE — CONSULT NOTE ADULT - SUBJECTIVE AND OBJECTIVE BOX
CRITICAL CARE CONSULTATION    REASON FOR CONSULTATION/INTERVAL HPI:  89 yo female with hx of dementia, multiple ischemic strokes, presents to [facility] from nursing facility after a fall.  A CT brain showed a right occipital stroke with hemorrhagic conversion.  Patient is a poor historian and denies any complaints. As per sons her mental status at this time is at baseline.   Patient has 2 medical record numbers, CT is under a different MRN.    EXAM:  Awake and alert, confused  right periorbital hematoma  PERRL  moves both upper extremities, moves RLE extremity  trace edema    RADIOLOGY:    On Admission   HPI:      PAST MEDICAL & SURGICAL HISTORY:  Dysphagia  HLD (hyperlipidemia)  Pleural effusion  HTN (hypertension)  Afib  Pneumonia  No significant past surgical history    Allergies    Allergy Status Unknown    Intolerances      SOCIAL HISTORY/FAMILY HISTORY:   Social History:    FAMILY HISTORY:  No pertinent family history in first degree relatives    Medications:      12 point ROS performed, pertinent positives and negatives mentioned above, all other ROS negative.     T(F): 98.6 (01-28-20 @ 15:31), Max: 98.6 (01-28-20 @ 15:31)  HR: 80 (01-28-20 @ 16:11)  BP: 104/52 (01-28-20 @ 16:11)  BP(mean): --  ABP: --  RR: 16 (01-28-20 @ 16:11)  SpO2: 98% (01-28-20 @ 16:11)        LABS:                        12.5   11.93 )-----------( 433      ( 28 Jan 2020 14:38 )             35.7     01-28    136  |  97<L>  |  21.0<H>  ----------------------------<  89  3.7   |  28.0  |  0.38<L>    Ca    8.8      28 Jan 2020 14:38    TPro  6.7  /  Alb  3.3  /  TBili  0.8  /  DBili  x   /  AST  26  /  ALT  17  /  AlkPhos  131<H>  01-28      CARDIAC MARKERS ( 28 Jan 2020 14:38 )  x     / 0.03 ng/mL / 116 U/L / x     / x          CAPILLARY BLOOD GLUCOSE        PT/INR - ( 28 Jan 2020 14:38 )   PT: 14.4 sec;   INR: 1.24 ratio         PTT - ( 28 Jan 2020 14:38 )  PTT:41.6 sec    CULTURES:

## 2020-01-28 NOTE — ED PROVIDER NOTE - PROGRESS NOTE DETAILS
Code trauma B cancelled by trauma team.  Trauma attending states that patient is ICH secondary to stroke. Neurosurgery called and made aware. Neurosurgery called back for update.  It is unclear if patient is a traumatic ICH or not and she will talk to radiology and her attending .  PA asked to communicate and abraham back so that disposition can be placed. Neurosurgery PA states patient to be admitted to MICU. ICU Attending Manuel states patient to be admitted to medicine. Dr. Solorio want neurosurgery to be notified and have a plan before accepting admission.

## 2020-01-28 NOTE — CONSULT NOTE ADULT - CONSULT REASON
right occipital hemorrhagic CVA conversion  s/p fall off the bed last night w head injury while on ASA 81mg

## 2020-01-28 NOTE — ED ADULT NURSE NOTE - OBJECTIVE STATEMENT
Pt. received alert/awake and confused w/ [facility] falling out of bed late evening night of 1/27/2020,. was assessed w/ no findings by staff and placed back in bed. When checked up on in morning found that there was swelling to right side of face. Pt. presents w/ swelling and bruised area to right orbital area. Pt. also presents w/ stage 1 pressure ulcer to sacral and bilateral gluteal area. Pt. denies any pain at current time. RN at [place] states pt. has been acting normally since incident.

## 2020-01-28 NOTE — H&P ADULT - NSICDXPASTMEDICALHX_GEN_ALL_CORE_FT
PAST MEDICAL HISTORY:  Afib     Dysphagia     HLD (hyperlipidemia)     HTN (hypertension)     Pleural effusion     Pneumonia

## 2020-01-28 NOTE — H&P ADULT - NSHPPHYSICALEXAM_GEN_ALL_CORE
General: An elderly  female lying in bed not in distress, awake answering questions.   HEENT: right periorbital area ecchymosis noted, PERRL. intact EOM. no throat erythema or exudate.   Neck: supple. no JVD.  Chest: CTA bilaterally  Heart: S1,S2. RRR. systolic heart murmur. no edema.   Abdomen: soft. non-tender. non-distended. + BS.   Ext: no calf tenderness. distal pulses intact.  Neuro: Alert, awake pt. thinks that she is in [place], no speech difficulty appreciated, no facial droop, B/L UE motor 5/5, rt. lower 4/5 , LLE motor about 3/5, as per son LLE is somewhat weak as baseline and likely as she had hip fracture on the left.   Skin: rt. augustin orbital ecchymosis. no other sig. rash noted. no pallor.   psych : no agitation, answering questions, affect somewhat flat.

## 2020-01-28 NOTE — ED ADULT NURSE NOTE - OBJECTIVE STATEMENT
pt awake and alert to self only, transferred to ED from [facility] s/p unwitnessed fall with intracranial hemorrhage, accepted by MD Krishnan and neuro. right eye ecchymotic. code trauma B activated and canceled by trauma team. no obvious deformities noted. pt in no apparent distress, offers no complaints at this time. awaiting neuro consult, will continue to monitor. pt awake and alert to self only, transferred to ED from [facility] s/p unwitnessed fall with intracranial hemorrhage, accepted by MD Krishnan and neuro. right eye ecchymotic. code trauma B activated and canceled by trauma team. no obvious deformities noted. stage II decubiti noted to sacrum, area clean and dry.  pt in no apparent distress, offers no complaints at this time. awaiting neuro consult, will continue to monitor.

## 2020-01-28 NOTE — H&P ADULT - HISTORY OF PRESENT ILLNESS
89 y/o female who lives at [facility] per history fell out of bed last night as per pt's son. Next day pt. was brought to [facility] and pt's ct head showed rt. occipital area cva with hemorrhagic conversion. pt. was accepted by neurosurgery team and pt. was transferred to [facility]. Pt. has been seen by neurosurgery team, MICU team and neurologist dr. lewis was notified. pt.'s son spoke to MICU attending Dr. Jackson in detail and has signed dnr/ dni and does not want ant aggresive measures. I spoke to neurosurgery PA, Dr. Jackson spoke to neuro surgery PA as well and discussed pt's son treatment wishes and dni / dni status and neurosurgery team is ok with q 2 neurochecks and step down admission, initially neurosurgery recommended MICU admission. pt. is awake in the ER , thinks that she is in [place] ( pt. was [place] in [place] as per son ) and stated that she is fine and nothing is bothering. denies cp, sob or HA, no abd. pain. no n/v/d. 89 y/o female who lives at [facility] per history fell out of bed last night as per pt's son. Next day pt. was brought to [facility] and pt's ct head showed rt. occipital area cva with hemorrhagic conversion. pt. was accepted by neurosurgery team and pt. was transferred to [facility]. Pt. has been seen by neurosurgery team, MICU team and neurologist dr. lewis was notified. pt.'s son spoke to MICU attending Dr. Jackson in detail and has signed dnr/ dni and does not want ant aggresive measures. I spoke to neurosurgery PA, Dr. Jackson spoke to neuro surgery PA as well and discussed pt's son treatment wishes and dni / dni status and neurosurgery team is ok with q 2 neurochecks and step down admission, initially neurosurgery recommended MICU admission. pt. is awake in the ER , thinks that she is in [place] ( pt. was born in [place] as per son ) and stated that she is fine and nothing is bothering. denies cp, sob or HA, no abd. pain. no n/v/d. As per son pt. is wheel chair bound for past 1 year and had stroke in 2017 and 2018 but not sure about the deficits from those strokes, Not on AC for afib likely due to fall risk. 89 y/o female who lives at [facility] per history fell out of bed last night as per pt's son. Next day pt. was brought to [facility] and pt's ct head showed rt. occipital area cva with hemorrhagic conversion. pt. was accepted by neurosurgery team and pt. was transferred to [facility]. Pt. has been seen by neurosurgery team, MICU team and neurologist dr. lewis was notified. pt.'s son spoke to MICU attending Dr. Jackson in detail and has signed dnr/ dni and does not want ant aggresive measures. I spoke to neurosurgery PA, Dr. Jackson spoke to neuro surgery PA as well and discussed pt's son treatment wishes and dni / dni status and neurosurgery team is ok with q 2 neurochecks and step down admission, initially neurosurgery recommended MICU admission. pt. is awake in the ER , thinks that she is in [place] ( pt. was born in [place] as per son ) and stated that she is fine and nothing is bothering. denies cp, sob or HA, no abd. pain. no n/v/d. As per son pt. is wheel chair bound for past 1 year and had stroke in 2017 and 2018 but not sure about the deficits from those strokes, Not on AC for afib likely due to fall risk.   pt. passed bedside swallow eval in the ER.

## 2020-01-28 NOTE — ED ADULT NURSE NOTE - NSIMPLEMENTINTERV_GEN_ALL_ED
Implemented All Fall with Harm Risk Interventions:  [place] to call system. Call bell, personal items and telephone within reach. Instruct patient to call for assistance. Room bathroom lighting operational. Non-slip footwear when patient is off stretcher. Physically safe environment: no spills, clutter or unnecessary equipment. Stretcher in lowest position, wheels locked, appropriate side rails in place. Provide visual cue, wrist band, yellow gown, etc. Monitor gait and stability. Monitor for mental status changes and reorient to person, place, and time. Review medications for side effects contributing to fall risk. Reinforce activity limits and safety measures with patient and family. Provide visual clues: red socks.

## 2020-01-28 NOTE — CONSULT NOTE ADULT - ASSESSMENT
89 yo female with dementia, prior strokes, recent falls, presents with right occipital ischemic stroke with hemorrhagic conversion.   After extensive discussion with sons, they do not want any aggressive interventions, would not want any neurosurgical interventions.  They would like to try to get her back to her nursing facility as soon as possible.  If she declines any further then they indicate they would be agreeable to comfort measures only.      Recommendations:  - would not re-image her brain unless there is a change in her mental status  - hold ASA  - palliative care consult  - I strongly recommend avoiding frequent neurochecks - as this would serve only to worsen her delirium  - does not require medical intensive care unit admission

## 2020-01-28 NOTE — GOALS OF CARE CONVERSATION - ADVANCED CARE PLANNING - CONVERSATION DETAILS
Spoke with 2 sons at bedside, patient has had several strokes and has been declining recently.  They tell me that patient would not want any aggressive interventions such as surgery, she would not want mechanical ventilation, CPR, tube feeds, or any other forms of life support.      I explained that we would do our best to try to get her as close her baseline as possible but unfortunately she is likely to have additional events in the future.  If her hemorrhagic stroke were to worsen then we would contact the sons and transition to comfort measures only.

## 2020-01-29 LAB
ANION GAP SERPL CALC-SCNC: 13 MMOL/L — SIGNIFICANT CHANGE UP (ref 5–17)
ANISOCYTOSIS BLD QL: SLIGHT — SIGNIFICANT CHANGE UP
BASOPHILS # BLD AUTO: 0 K/UL — SIGNIFICANT CHANGE UP (ref 0–0.2)
BASOPHILS NFR BLD AUTO: 0 % — SIGNIFICANT CHANGE UP (ref 0–2)
BUN SERPL-MCNC: 16 MG/DL — SIGNIFICANT CHANGE UP (ref 8–20)
CALCIUM SERPL-MCNC: 8.4 MG/DL — LOW (ref 8.6–10.2)
CHLORIDE SERPL-SCNC: 103 MMOL/L — SIGNIFICANT CHANGE UP (ref 98–107)
CHOLEST SERPL-MCNC: 119 MG/DL — SIGNIFICANT CHANGE UP (ref 110–199)
CO2 SERPL-SCNC: 22 MMOL/L — SIGNIFICANT CHANGE UP (ref 22–29)
CREAT SERPL-MCNC: 0.2 MG/DL — LOW (ref 0.5–1.3)
DIGOXIN SERPL-MCNC: 0.7 NG/ML — LOW (ref 0.8–2)
EOSINOPHIL # BLD AUTO: 0 K/UL — SIGNIFICANT CHANGE UP (ref 0–0.5)
EOSINOPHIL NFR BLD AUTO: 0 % — SIGNIFICANT CHANGE UP (ref 0–6)
GIANT PLATELETS BLD QL SMEAR: PRESENT — SIGNIFICANT CHANGE UP
GLUCOSE SERPL-MCNC: 94 MG/DL — SIGNIFICANT CHANGE UP (ref 70–99)
HBA1C BLD-MCNC: 5.4 % — SIGNIFICANT CHANGE UP (ref 4–5.6)
HCT VFR BLD CALC: 38.8 % — SIGNIFICANT CHANGE UP (ref 34.5–45)
HDLC SERPL-MCNC: 34 MG/DL — LOW
HGB BLD-MCNC: 12.7 G/DL — SIGNIFICANT CHANGE UP (ref 11.5–15.5)
LIPID PNL WITH DIRECT LDL SERPL: 67 MG/DL — SIGNIFICANT CHANGE UP
LYMPHOCYTES # BLD AUTO: 1.79 K/UL — SIGNIFICANT CHANGE UP (ref 1–3.3)
LYMPHOCYTES # BLD AUTO: 18.6 % — SIGNIFICANT CHANGE UP (ref 13–44)
MANUAL SMEAR VERIFICATION: SIGNIFICANT CHANGE UP
MCHC RBC-ENTMCNC: 30.2 PG — SIGNIFICANT CHANGE UP (ref 27–34)
MCHC RBC-ENTMCNC: 32.7 GM/DL — SIGNIFICANT CHANGE UP (ref 32–36)
MCV RBC AUTO: 92.4 FL — SIGNIFICANT CHANGE UP (ref 80–100)
MICROCYTES BLD QL: SLIGHT — SIGNIFICANT CHANGE UP
MONOCYTES # BLD AUTO: 0.68 K/UL — SIGNIFICANT CHANGE UP (ref 0–0.9)
MONOCYTES NFR BLD AUTO: 7.1 % — SIGNIFICANT CHANGE UP (ref 2–14)
NEUTROPHILS # BLD AUTO: 6.96 K/UL — SIGNIFICANT CHANGE UP (ref 1.8–7.4)
NEUTROPHILS NFR BLD AUTO: 72.5 % — SIGNIFICANT CHANGE UP (ref 43–77)
OVALOCYTES BLD QL SMEAR: SLIGHT — SIGNIFICANT CHANGE UP
PLAT MORPH BLD: NORMAL — SIGNIFICANT CHANGE UP
PLATELET # BLD AUTO: 378 K/UL — SIGNIFICANT CHANGE UP (ref 150–400)
POIKILOCYTOSIS BLD QL AUTO: SLIGHT — SIGNIFICANT CHANGE UP
POLYCHROMASIA BLD QL SMEAR: SLIGHT — SIGNIFICANT CHANGE UP
POTASSIUM SERPL-MCNC: 3.4 MMOL/L — LOW (ref 3.5–5.3)
POTASSIUM SERPL-SCNC: 3.4 MMOL/L — LOW (ref 3.5–5.3)
RBC # BLD: 4.2 M/UL — SIGNIFICANT CHANGE UP (ref 3.8–5.2)
RBC # FLD: 14.8 % — HIGH (ref 10.3–14.5)
RBC BLD AUTO: ABNORMAL
SODIUM SERPL-SCNC: 138 MMOL/L — SIGNIFICANT CHANGE UP (ref 135–145)
TOTAL CHOLESTEROL/HDL RATIO MEASUREMENT: 4 RATIO — SIGNIFICANT CHANGE UP (ref 3.3–7.1)
TRIGL SERPL-MCNC: 88 MG/DL — SIGNIFICANT CHANGE UP (ref 10–200)
VARIANT LYMPHS # BLD: 1.8 % — SIGNIFICANT CHANGE UP (ref 0–6)
WBC # BLD: 9.6 K/UL — SIGNIFICANT CHANGE UP (ref 3.8–10.5)
WBC # FLD AUTO: 9.6 K/UL — SIGNIFICANT CHANGE UP (ref 3.8–10.5)

## 2020-01-29 PROCEDURE — 70450 CT HEAD/BRAIN W/O DYE: CPT | Mod: 26

## 2020-01-29 PROCEDURE — 99233 SBSQ HOSP IP/OBS HIGH 50: CPT

## 2020-01-29 PROCEDURE — 99223 1ST HOSP IP/OBS HIGH 75: CPT

## 2020-01-29 RX ADMIN — SENNA PLUS 1 TABLET(S): 8.6 TABLET ORAL at 21:29

## 2020-01-29 RX ADMIN — Medication 25 MILLIGRAM(S): at 06:16

## 2020-01-29 RX ADMIN — Medication 0.12 MILLIGRAM(S): at 06:16

## 2020-01-29 RX ADMIN — ATORVASTATIN CALCIUM 80 MILLIGRAM(S): 80 TABLET, FILM COATED ORAL at 21:29

## 2020-01-29 RX ADMIN — Medication 25 MILLIGRAM(S): at 17:13

## 2020-01-29 NOTE — SPEECH LANGUAGE PATHOLOGY EVALUATION - SLP GENERAL OBSERVATIONS
Pt received & seen seated upright via stretcher in ED, +awake/alert, +reduced cognition, +dysarthria, 0/10 pain

## 2020-01-29 NOTE — SWALLOW BEDSIDE ASSESSMENT ADULT - ASR SWALLOW ASPIRATION MONITOR
oral hygiene/pneumonia/throat clearing/upper respiratory infection/cough/fever/change of breathing pattern/position upright (89Y)/gurgly voice

## 2020-01-29 NOTE — CHART NOTE - NSCHARTNOTEFT_GEN_A_CORE
Patient is DNR/DNI.  Neurologically stable.  No indication for Neurosurgical intervention.  No further inpatient recommendations.

## 2020-01-29 NOTE — SWALLOW BEDSIDE ASSESSMENT ADULT - ORAL PHASE
Within functional limits impacted by reduced attention to bolus & verbalizing with PO in oral cavity/Delayed oral transit time

## 2020-01-29 NOTE — SWALLOW BEDSIDE ASSESSMENT ADULT - SWALLOW EVAL: RECOMMENDED FEEDING/EATING TECHNIQUES
maintain upright posture during/after eating for 30 mins/oral hygiene/check mouth frequently for oral residue/pocketing/crush medication (when feasible)/allow for swallow between intakes/small sips/bites/no straws/position upright (90 degrees)

## 2020-01-29 NOTE — CHART NOTE - NSCHARTNOTEFT_GEN_A_CORE
89 year old woman hx of dementia transferred on 01/28/2020 from [facility] for ICH s/p fall.  Patient was found on ground with facial trauma and had CT scan that showed hemorrhage. 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 Cold autoantibodies are generally not clinically significant, but on rare cases they are responsible for autoimmune hemolytic anemias. They can also cause delays in finding compatible blood for transfusion. Please allow sufficient time for pre-transfusion blood bank workup.

## 2020-01-29 NOTE — SWALLOW BEDSIDE ASSESSMENT ADULT - SWALLOW EVAL: DIAGNOSIS
Mild oral dysphagia impacted by cognition with reduced attention to bolus. Pharyngeal dysphagia suspected for thin fluids. Pharyngeal stage of swallow clinically unremarkable for puree, mech soft & nectar thick fluids with no overt s/s aspiration

## 2020-01-29 NOTE — SWALLOW BEDSIDE ASSESSMENT ADULT - SLP GENERAL OBSERVATIONS
Pt received & seen seated upright via stretcher in ER, +awake/alert, +reduced cognition, +dysarthria, 0/10 pain

## 2020-01-29 NOTE — SWALLOW BEDSIDE ASSESSMENT ADULT - COMMENTS
As per H&P:  "89 y/o female who lives at [facility] per history fell out of bed last night as per pt's son. Next day pt. was brought to [facility] and pt's ct head showed rt. occipital area cva with hemorrhagic conversion. pt. was accepted by neurosurgery team and pt. was transferred to [facility]. Pt. has been seen by neurosurgery team, MICU team and neurologist dr. lewis was notified. pt.'s son spoke to MICU attending Dr. Jackson in detail and has signed dnr/ dni and does not want ant aggresive measures. I spoke to neurosurgery PA, Dr. Jackson spoke to neuro surgery PA as well and discussed pt's son treatment wishes and dni / dni status and neurosurgery team is ok with q 2 neurochecks and step down admission, initially neurosurgery recommended MICU admission."

## 2020-01-29 NOTE — SPEECH LANGUAGE PATHOLOGY EVALUATION - SLP CONVERSATIONAL SPEECH
Pt presented for influenza vaccination. Pt tolerated injection without incident.     
fluent with appropriate syntax/semantics, however, +confused

## 2020-01-29 NOTE — SPEECH LANGUAGE PATHOLOGY EVALUATION - COMMENTS
As per H&P:  "89 y/o female who lives at [facility] per history fell out of bed last night as per pt's son. Next day pt. was brought to [facility] and pt's ct head showed rt. occipital area cva with hemorrhagic conversion. pt. was accepted by neurosurgery team and pt. was transferred to [facility]. Pt. has been seen by neurosurgery team, MICU team and neurologist dr. lewis was notified. pt.'s son spoke to MICU attending Dr. Jackson in detail and has signed dnr/ dni and does not want ant aggresive measures. I spoke to neurosurgery PA, Dr. Jackson spoke to neuro surgery PA as well and discussed pt's son treatment wishes and dni / dni status and neurosurgery team is ok with q 2 neurochecks and step down admission, initially neurosurgery recommended MICU admission." not assessed to be assessed: deficits informally noted with reduced orientation & recall Mild to moderate dysarthria, with reduced speech intelligibility at sentence level

## 2020-01-29 NOTE — CONSULT NOTE ADULT - SUBJECTIVE AND OBJECTIVE BOX
[facility]                                        [facility] [place]                                  [facility]                                      [address]                                           [place], [place], [address]                                                [phone]        CC: Hemorrhagic stroke.     HISTORY:  The patient is a 89y Female who reportedly fell out of bed at the senior living facility where she resides. She was found on the floor with facial ecchymoses. She was taken to [facility] and was transferred to [facility] when CT showed intracranial hemorrhage.   She was evaluated by the ICU team and after discussion with family the decision was made to instate DNR/DNI orders and forego aggressive interventions.     She has atrial fibrillation but was not on anticoagulation reportedly due to fall risk.      PAST MEDICAL & SURGICAL HISTORY:  Dysphagia  HLD (hyperlipidemia)  Pleural effusion  HTN (hypertension)  Afib  Pneumonia  History of total hip replacement, left:     MEDICATION PRIOR TO ADMISSION:  · 	aspirin 81 mg oral tablet, chewable  · 	Lopressor  · 	atorvastatin 20 mg oral tablet  · 	digoxin 125 mcg (0.125 mg) oral tablet  · 	docusate sodium 100 mg oral tablet  · 	enalapril 2.5 mg oral tablet: Last Dose Taken  · 	Lasix 20 mg oral tablet  · 	Senna 8.6 mg oral tablet  · 	Bacid (LAC) oral tablet    MEDICATIONS  (STANDING):  atorvastatin 80 milliGRAM(s) Oral at bedtime  digoxin     Tablet 0.125 milliGRAM(s) Oral daily  metoprolol tartrate 25 milliGRAM(s) Oral two times a day  senna 1 Tablet(s) Oral at bedtime    Allergies  Allergy Status Unknown    SOCIAL HISTORY:  Non smoker.     FAMILY HISTORY:  Family history of diabetes mellitus (DM): mother  Mother .   Father .   No known family history of stroke.     ROS:  Constitutional: The patient denies fevers or weight changes.  Neuro: As per HPI.  Eyes: Denies blurry vision.  Ears/nose/throat: Denies Tinnitus.   Cardiac: Denies chest pain. Denies palpitations.  Respiratory: Denies shortness of breath.  GI: Denies abdominal pain, nausea, or vomiting.  : Denies change in urinary pattern.  Integumentary: Denies rash.  Psych: Denies recent mood changes.  Heme: denies easy bleeding/bruising.    Exam:  Vital Signs Last 24 Hrs  T(C): 36.6 (2020 07:52), Max: 37 (2020 15:31)  T(F): 97.9 (2020 07:52), Max: 98.6 (2020 15:31)  HR: 83 (2020 07:52) (80 - 111)  BP: 118/76 (2020 07:52) (102/58 - 145/83)  RR: 20 (2020 07:52) (16 - 20)  SpO2: 98% (2020 07:52) (95% - 98%)  General: NAD.   Carotid bruits absent.     Mental status: The patient is awake, alert, and oriented to self only. She is unable to give day/date and does not recognise that she is in the hospital. There is no aphasia.    Cranial nerves: There is no papilledema. Pupils react symmetrically to light. There is no visual field deficit to confrontation. Extraocular motion is full with no nystagmus. There is no ptosis. Facial sensation is intact. Facial musculature is symmetric. Palate elevates symmetrically. Tongue is midline.    Motor: There is normal bulk and tone.  Strength is symmetric and grossly 5/5 in the upper extremities.   Strength is symmetric although diffusely weak in the lower extremities.     Sensation: Intact to light touch and pin.    Reflexes: 1+ throughout and plantar responses are flexor.    Cerebellar: There is no dysmetria on finger to nose testing.    LABS:                         12.7   9.60  )-----------( 378      ( 2020 06:03 )             38.8           138  |  103  |  16.0  ----------------------------<  94  3.4<L>   |  22.0  |  0.20<L>    Ca    8.4<L>      2020 06:03    TPro  6.7  /  Alb  3.3  /  TBili  0.8  /  DBili  x   /  AST  26  /  ALT  17  /  AlkPhos  131<H>  28      PT/INR - ( 2020 14:38 )   PT: 14.4 sec;   INR: 1.24 ratio    PTT - ( 2020 14:38 )  PTT:41.6 sec    RADIOLOGY   CT head from [place] (Different MRN) was reviewed.   There is hemorrhagic infarct in the right PCA territory.

## 2020-01-29 NOTE — CONSULT NOTE ADULT - ASSESSMENT
The patient is a 89y Female with hemorrhagic stroke in the right posterior cerebral artery territory.     Hemorrhagic CVA.   Given atrial fibrillation likely embolic.   Agree that she is a poor long term anticoagulation candidate.   Hold antiplatelet and anticoagulation for now.   Repeat CT head to assess for stability of hemorrhage.   Already seen by neurosurgery.  Resume low dose Aspirin when ok with neurosurgery.  LDL: 67  At goal of < 70  Statin.     atrial fibrillation   Digoxin per cardiology.   Hold antiplatelet/anticoagulation for now.     Discharge planning.   May need nursing home placement as does not appear safe to go back to senior living facility unsupervised.     Case discussed with Dr Fontaine.

## 2020-01-30 ENCOUNTER — TRANSCRIPTION ENCOUNTER (OUTPATIENT)
Age: 85
End: 2020-01-30

## 2020-01-30 VITALS
RESPIRATION RATE: 20 BRPM | TEMPERATURE: 98 F | HEART RATE: 91 BPM | DIASTOLIC BLOOD PRESSURE: 87 MMHG | SYSTOLIC BLOOD PRESSURE: 140 MMHG

## 2020-01-30 PROCEDURE — 80162 ASSAY OF DIGOXIN TOTAL: CPT

## 2020-01-30 PROCEDURE — 70450 CT HEAD/BRAIN W/O DYE: CPT

## 2020-01-30 PROCEDURE — 85027 COMPLETE CBC AUTOMATED: CPT

## 2020-01-30 PROCEDURE — 36415 COLL VENOUS BLD VENIPUNCTURE: CPT

## 2020-01-30 PROCEDURE — 83036 HEMOGLOBIN GLYCOSYLATED A1C: CPT

## 2020-01-30 PROCEDURE — 99233 SBSQ HOSP IP/OBS HIGH 50: CPT

## 2020-01-30 PROCEDURE — 86905 BLOOD TYPING RBC ANTIGENS: CPT

## 2020-01-30 PROCEDURE — 92523 SPEECH SOUND LANG COMPREHEN: CPT

## 2020-01-30 PROCEDURE — 84484 ASSAY OF TROPONIN QUANT: CPT

## 2020-01-30 PROCEDURE — 93306 TTE W/DOPPLER COMPLETE: CPT | Mod: 26

## 2020-01-30 PROCEDURE — 93005 ELECTROCARDIOGRAM TRACING: CPT

## 2020-01-30 PROCEDURE — 86901 BLOOD TYPING SEROLOGIC RH(D): CPT

## 2020-01-30 PROCEDURE — 82550 ASSAY OF CK (CPK): CPT

## 2020-01-30 PROCEDURE — 80048 BASIC METABOLIC PNL TOTAL CA: CPT

## 2020-01-30 PROCEDURE — 93880 EXTRACRANIAL BILAT STUDY: CPT

## 2020-01-30 PROCEDURE — 80061 LIPID PANEL: CPT

## 2020-01-30 PROCEDURE — 85730 THROMBOPLASTIN TIME PARTIAL: CPT

## 2020-01-30 PROCEDURE — 86900 BLOOD TYPING SEROLOGIC ABO: CPT

## 2020-01-30 PROCEDURE — 99239 HOSP IP/OBS DSCHRG MGMT >30: CPT

## 2020-01-30 PROCEDURE — 99285 EMERGENCY DEPT VISIT HI MDM: CPT

## 2020-01-30 PROCEDURE — 86870 RBC ANTIBODY IDENTIFICATION: CPT

## 2020-01-30 PROCEDURE — 85610 PROTHROMBIN TIME: CPT

## 2020-01-30 PROCEDURE — 86880 COOMBS TEST DIRECT: CPT

## 2020-01-30 PROCEDURE — 93306 TTE W/DOPPLER COMPLETE: CPT

## 2020-01-30 PROCEDURE — 97167 OT EVAL HIGH COMPLEX 60 MIN: CPT

## 2020-01-30 PROCEDURE — 92610 EVALUATE SWALLOWING FUNCTION: CPT

## 2020-01-30 PROCEDURE — 86850 RBC ANTIBODY SCREEN: CPT

## 2020-01-30 PROCEDURE — 71045 X-RAY EXAM CHEST 1 VIEW: CPT

## 2020-01-30 PROCEDURE — 80053 COMPREHEN METABOLIC PANEL: CPT

## 2020-01-30 RX ORDER — DIGOXIN 250 MCG
1 TABLET ORAL
Qty: 30 | Refills: 0
Start: 2020-01-30

## 2020-01-30 RX ORDER — FUROSEMIDE 40 MG
1 TABLET ORAL
Qty: 30 | Refills: 0
Start: 2020-01-30

## 2020-01-30 RX ORDER — METOPROLOL TARTRATE 50 MG
1 TABLET ORAL
Qty: 60 | Refills: 0
Start: 2020-01-30

## 2020-01-30 RX ORDER — ATORVASTATIN CALCIUM 80 MG/1
1 TABLET, FILM COATED ORAL
Qty: 0 | Refills: 0 | DISCHARGE
Start: 2020-01-30

## 2020-01-30 RX ORDER — DOCUSATE SODIUM 100 MG
1 CAPSULE ORAL
Qty: 60 | Refills: 0
Start: 2020-01-30

## 2020-01-30 RX ORDER — ATORVASTATIN CALCIUM 80 MG/1
1 TABLET, FILM COATED ORAL
Qty: 30 | Refills: 0
Start: 2020-01-30

## 2020-01-30 RX ORDER — SENNA PLUS 8.6 MG/1
1 TABLET ORAL
Qty: 30 | Refills: 0
Start: 2020-01-30

## 2020-01-30 RX ORDER — INFLUENZA VIRUS VACCINE 15; 15; 15; 15 UG/.5ML; UG/.5ML; UG/.5ML; UG/.5ML
0.5 SUSPENSION INTRAMUSCULAR ONCE
Refills: 0 | Status: DISCONTINUED | OUTPATIENT
Start: 2020-01-30 | End: 2020-01-30

## 2020-01-30 RX ADMIN — Medication 25 MILLIGRAM(S): at 07:04

## 2020-01-30 RX ADMIN — Medication 25 MILLIGRAM(S): at 17:21

## 2020-01-30 RX ADMIN — Medication 0.12 MILLIGRAM(S): at 07:04

## 2020-01-30 NOTE — PHYSICAL THERAPY INITIAL EVALUATION ADULT - LEVEL OF INDEPENDENCE: SUPINE/SIT, REHAB EVAL
maximum assist (25% patients effort) Pt. demonstrating left sided inattention. pt. requiring b/l knees blocks to stand at bedside. unable to attain full upright. Pt. increased tone into adduction./maximum assist (25% patients effort)

## 2020-01-30 NOTE — OCCUPATIONAL THERAPY INITIAL EVALUATION ADULT - ADDITIONAL COMMENTS
Pt is a poor historian and it is unclear of pt's physical abilities prior to hospitalization  Pt is right handed

## 2020-01-30 NOTE — OCCUPATIONAL THERAPY INITIAL EVALUATION ADULT - PLANNED THERAPY INTERVENTIONS, OT EVAL
cognitive, visual perceptual/neuromuscular re-education/transfer training/balance training/motor coordination training/ADL retraining/bed mobility training/strengthening/ROM

## 2020-01-30 NOTE — DISCHARGE NOTE PROVIDER - NSDCMRMEDTOKEN_GEN_ALL_CORE_FT
aspirin 81 mg oral tablet, chewable: 1 tab(s) orally once a day restart 2/7/20  atorvastatin 80 mg oral tablet: 1 tab(s) orally once a day (at bedtime)  digoxin 125 mcg (0.125 mg) oral tablet: 1 tab(s) orally once a day  docusate sodium 100 mg oral tablet:   enalapril 2.5 mg oral tablet: 1 tab(s) orally once a day  Lasix 20 mg oral tablet: 1 tab(s) orally once a day  Lopressor: 25 milligram(s) orally 2 times a day  Senna 8.6 mg oral tablet: 1 tab(s) orally once a day (at bedtime) aspirin 81 mg oral tablet, chewable: 1 tab(s) orally once a day restart 2/7/20  atorvastatin 80 mg oral tablet: 1 tab(s) orally once a day (at bedtime)  digoxin 125 mcg (0.125 mg) oral tablet: 1 tab(s) orally once a day  docusate sodium 100 mg oral tablet: 1 tab(s) orally 2 times a day   enalapril 2.5 mg oral tablet: 1 tab(s) orally once a day  Lasix 20 mg oral tablet: 1 tab(s) orally once a day  metoprolol tartrate 25 mg oral tablet: 1 tab(s) orally 2 times a day   Senna 8.6 mg oral tablet: 1 tab(s) orally once a day (at bedtime)

## 2020-01-30 NOTE — DISCHARGE NOTE PROVIDER - NSDCCPCAREPLAN_GEN_ALL_CORE_FT
PRINCIPAL DISCHARGE DIAGNOSIS  Diagnosis: Hemorrhagic stroke  Assessment and Plan of Treatment:       SECONDARY DISCHARGE DIAGNOSES  Diagnosis: Heart murmur  Assessment and Plan of Treatment: Heart murmur    Diagnosis: Chronic atrial fibrillation  Assessment and Plan of Treatment: Chronic atrial fibrillation    Diagnosis: Hemorrhage  Assessment and Plan of Treatment:

## 2020-01-30 NOTE — DISCHARGE NOTE PROVIDER - HOSPITAL COURSE
89F PMH Dementia, Dysphagia, HTN, HLD, Afib presents after reported fall at senior [facility] living [facility], found at [facility] to have R Occipital area CVA with hemorrhagic conversion.  Transferred to [facility] and seen by Neurology, Neurosurgery and Critical care.  Per family wishes, pt is DNR / DNI.        ·  Problem: Cerebrovascular accident (CVA) - R Occipital CVA with Hemorrhagic Conversion.  Repeat CT unchanged.  Pt clinically unchanged.  Per discussion with son Mehran today, he agrees with conservative management, emphasizing pt's return to her assisted living facility.  Discussed reinitiating ASA with Neurosurgery, recommending restarting ASA 81mg in 7 days.  Risks, benefits and alternatives reviewed with son, in agreement.     ·  Problem: Fall, initial encounter.  Plan: s/p fall as per history, seen by trauma and cleared.     ·  Problem: Chronic atrial fibrillation.  Plan: no asa, plavix or AC at this point. Rate control with Digoxin and BBlocker.     ·  Problem: Essential hypertension.  Plan: Bblocker with parameters.     ·  Problem: Heart murmur.  Plan: TTE    DNR / DNI    # DVT Prophylaxis - Lower extremity intermittent compression devices.         Disposition: Stable for discharge.  Outpatient followup discussed.    Total time spent on discharge is  45  minutes.

## 2020-01-30 NOTE — PHYSICAL THERAPY INITIAL EVALUATION ADULT - PERTINENT HX OF CURRENT PROBLEM, REHAB EVAL
4F PMH Dementia, Dysphagia, HTN, HLD, Afib presents after reported fall at senior [facility] living [facility], found at [facility] to have R Occipital area CVA with hemorrhagic conversion. 89F PMH Dementia, Dysphagia, HTN, HLD, Afib presents after reported fall at senior citizen living facility, found at [facility] to have R Occipital area CVA with hemorrhagic conversion.

## 2020-01-30 NOTE — PHYSICAL THERAPY INITIAL EVALUATION ADULT - ADDITIONAL COMMENTS
Pt. unable to provide information due to cognition. Pt. is from [facility] facility and as per chart pt. spends most of time in W/C. Further information not available re: social or functional hx.

## 2020-01-30 NOTE — PROGRESS NOTE ADULT - ASSESSMENT
89F PMH Dementia, Dysphagia, HTN, HLD, Afib presents after [facility] fall at [facility], [facility] to have R Occipital area CVA with hemorrhagic conversion.  Transferred to [facility] and seen by Neurology, Neurosurgery and Critical care.  Per family wishes, pt is DNR / DNI.    ·  Problem: Cerebrovascular accident (CVA) - R Occipital CVA with Hemorrhagic Conversion.  I contacted pt's son Mehran to discuss goals of care.  He agrees with current observation and repeat CT to assess status of bleeidng and to assess role for reinitiating antiplatelets.  He expressed desire to treat conservatively, with no further surgical intervention.    ·  Problem: Fall, initial encounter.  Plan: s/p fall as per history, seen by trauma and cleared.   ·  Problem: Chronic atrial fibrillation.  Plan: no asa, plavix or AC at this point. Rate control with Digoxin and BBlocker.   ·  Problem: Essential hypertension.  Plan: Bblocker with parameters.   ·  Problem: Heart murmur.  Plan: TTE  # DVT Prophylaxis - Lower extremity intermittent compression devices.
89F PMH Dementia, Dysphagia, HTN, HLD, Afib presents after reported fall at senior [facility] living [facility], found at [facility] to have R Occipital area CVA with hemorrhagic conversion.  Transferred to [facility] and seen by Neurology, Neurosurgery and Critical care.  Per family wishes, pt is DNR / DNI.    ·  Problem: Cerebrovascular accident (CVA) - R Occipital CVA with Hemorrhagic Conversion.  Repeat CT unchanged.  Pt clinically unchanged.  Per discussion with son Mehran today, he agrees with conservative management, emphasizing pt's return to her assisted living facility.  Discussed reinitiating ASA with Neurosurgery, recommending restarting ASA 81mg in 7 days.  Risks, benefits and alternatives reviewed with son, in agreement.   ·  Problem: Fall, initial encounter.  Plan: s/p fall as per history, seen by trauma and cleared.   ·  Problem: Chronic atrial fibrillation.  Plan: no asa, plavix or AC at this point. Rate control with Digoxin and BBlocker.   ·  Problem: Essential hypertension.  Plan: Bblocker with parameters.   ·  Problem: Heart murmur.  Plan: TTE  DNR / DNI  # DVT Prophylaxis - Lower extremity intermittent compression devices.
89y Female with hemorrhagic stroke in the right posterior cerebral artery territory.     Hemorrhagic CVA.   Given atrial fibrillation likely embolic.   Agree that she is a poor long term anticoagulation candidate.   Hold antiplatelet and anticoagulation for now.   Repeat CT head stable.  Already seen by neurosurgery.  Resume low dose Aspirin when ok with neurosurgery.  LDL: 67  At goal of < 70  Statin.     Atrial fibrillation   Digoxin per cardiology.   Hold antiplatelet/anticoagulation for now.     Discharge planning.   May need nursing home placement as does not appear safe to go back to senior living facility unsupervised.     No further specific neurologic recommendations. Please recall if I can be of further assistance.     Case discussed with Dr Fontaine.
ASSESSMENT/PLAN: 89yoF demented at baseline presented after reported fall out of bed    NEURO: ICH  Repeat CTH in AM for stability  q1hr neurochecks  SBP<160  repeat CTH for stability  pain mgt: tylenol and oxy 5 prn  Activity: [x] mobilize as tolerated [] Bedrest [x] PT [x] OT [] PMNR    PULM: room air   SpO2>92%  incentive spirometry   OOB    CV: afib, HTN  SBP goal <160    RENAL: monitor I/O  replete lytes prn  voiding  Fluids: IVF while NPO    GI:  Diet: advance diet as tolerated  GI prophylaxis [x] not indicated   zofran prn for nausea  Bowel regimen [x] senna [] other:    ENDO:   Goal euglycemia (-180)    HEME/ONC:  hold all AC/AP  ASA and chemical DVT PPX at this time   VTE prophylaxis: [] SCDs [x] chemoprophylaxis held 24hrs post-bleed     ID: afebrile   no leukocytosis    MISC:    [] Patient is at high risk of neurologic deterioration/death due to: ICH    Time seen:  Time spent: __35_ [] critical care minutes

## 2020-01-30 NOTE — OCCUPATIONAL THERAPY INITIAL EVALUATION ADULT - LIGHT TOUCH SENSATION, LUE, REHAB EVAL
"-- Message is from the [employer]--    Patient is requesting a medication refill - medication is on active list, but patient is requesting a change to the medication prescription    Change requested:  Patients son requesting Dr. Syd Foss Increase the amount and instructions of this medication to 3x a day. As the patient is using it more than 2x a day and has now run out of test strips. Patients son also requesting a 90 day supply of the test strips as the patient keeps running out. Please send this new prescription to the pharmacy. RX Name and Dose:   blood glucose test strip    Duration: 30 days    Pharmacy  [facility] [address] [facility]    Patient confirmed the above pharmacy as correct? Yes    Caller Information       Type Contact Phone    06/13/2019 03:59 PM Phone (Incoming) LOGAN WATSON (Emergency Contact) [phone] (M)          Alternative phone number: N/a     Turnaround time given to caller: ""This message will be sent to [facility] Provider's name]. The clinical team will fulfill your request as soon as they review your message. \""  " not tested

## 2020-01-30 NOTE — DISCHARGE NOTE NURSING/CASE MANAGEMENT/SOCIAL WORK - PATIENT PORTAL LINK FT
You can access the FollowMyHealth Patient Portal offered by [facility] by registering at the following website: [email or web address]. By joining [facility]’s FollowMyHealth portal, you will also be able to view your health information using other applications (apps) compatible with our system.

## 2020-01-30 NOTE — PATIENT PROFILE ADULT - NSPROMUTANXFEARFT_GEN_A_NUR
Preventive Health Recommendations:   Male Ages 65 and over    Yearly exam:             See your health care provider every year in order to  o   Review health changes.   o   Discuss preventive care.    o   Review your medicines if your doctor has prescribed any.    Talk with your health care provider about whether you should have a test to screen for prostate cancer (PSA).    Every 3 years, have a diabetes test (fasting glucose). If you are at risk for diabetes, you should have this test more often.    Every 5 years, have a cholesterol test. Have this test more often if you are at risk for high cholesterol or heart disease.     Every 10 years, have a colonoscopy. Or, have a yearly FIT test (stool test). These exams will check for colon cancer.    Talk to with your health care provider about screening for Abdominal Aortic Aneurysm if you have a family history of AAA or have a history of smoking.    Shots:     Get a flu shot each year.     Get a tetanus shot every 10 years.     Talk to your doctor about your pneumonia vaccines. There are now two you should receive - Pneumovax (PPSV 23) and Prevnar (PCV 13).     Talk to your doctor about a shingles vaccine.     Talk to your doctor about the hepatitis B vaccine.  Nutrition:     Eat at least 5 servings of fruits and vegetables each day.     Eat whole-grain bread, whole-wheat pasta and brown rice instead of white grains and rice.     Talk to your provider about Calcium and Vitamin D.   Lifestyle    Exercise for at least 150 minutes a week (30 minutes a day, 5 days a week). This will help you control your weight and prevent disease.     Limit alcohol to one drink per day.     No smoking.     Wear sunscreen to prevent skin cancer.     See your dentist every six months for an exam and cleaning.     See your eye doctor every 1 to 2 years to screen for conditions such as glaucoma, macular degeneration, cataracts, etc   
none at this time

## 2020-01-30 NOTE — ED ADULT NURSE REASSESSMENT NOTE - NS ED NURSE REASSESS COMMENT FT1
ICU @ bedside for eval.
family at bedside.
neuro @ bedside for eval.
pt resting comfortably, in no apparent distress or discomfort. a-fib on monitor, maintaining 02 on RA @ 98%. [facility] given to Tyrone ZAMUDIO. pt accepted.
received pt from off-going shift. pt a&ox2-3. denies any pain/discomfort. generalized ecchymosis. NSR on cardiac monitor. HOB elevated. purwick and vcb boots in place. transport at bedside to take pt to echo. ILSA in chart from MD. pending bed. updated on plan of care, verbalize understanding. call bell in reach
Improved

## 2020-01-30 NOTE — OCCUPATIONAL THERAPY INITIAL EVALUATION ADULT - RANGE OF MOTION EXAMINATION
Right UE grossly WFLs, except right hand in non fixed flexion contracture; Left UE 1/2 shoulder flexion, 1/2 elbow flexion, non fixed flexion contracture at left 3rd, 4th, 5th digits 1/4 flexion/extension

## 2020-01-30 NOTE — DISCHARGE NOTE NURSING/CASE MANAGEMENT/SOCIAL WORK - NSDCPEPTSTRK_GEN_ALL_CORE
Stroke education booklet/Call 911 for stroke/Risk factors for stroke/Stroke support groups for patients, families, and friends/Need for follow up after discharge/Prescribed medications/Stroke warning signs and symptoms/Signs and symptoms of stroke

## 2020-01-30 NOTE — PHYSICAL THERAPY INITIAL EVALUATION ADULT - MUSCLE TONE ASSESSMENT, REHAB EVAL
right-side extremities/left-side extremities/normal/mildly increased tone see OT eval for UEs/Right LE/mildly increased tone/moderately increased tone/Left LE

## 2020-01-30 NOTE — CHART NOTE - NSCHARTNOTEFT_GEN_A_CORE
Neurosurgery    right occipital hemorrhagic CVA conversion  s/p fall off the bed last night w head injury while on ASA 81mg    Pt was made DNR/DNI. No aggressive measures as per family request.     Repeat CT brain on 1/30/2020 reads as: "Acute/subacute right PCA territory infarct with hemorrhagic transformation, not significantly changed."        Pt may restart ASA 81 mg in 10 days   Please call for any further questions.

## 2020-01-31 LAB
ALLERGY+IMMUNOLOGY DIAG STUDY NOTE: SIGNIFICANT CHANGE UP
ANTIBODY INTERPRETATION 2: SIGNIFICANT CHANGE UP

## 2020-01-31 PROCEDURE — 86870 RBC ANTIBODY IDENTIFICATION: CPT

## 2020-01-31 PROCEDURE — 72125 CT NECK SPINE W/O DYE: CPT

## 2020-01-31 PROCEDURE — 85610 PROTHROMBIN TIME: CPT

## 2020-01-31 PROCEDURE — 80053 COMPREHEN METABOLIC PANEL: CPT

## 2020-01-31 PROCEDURE — 85027 COMPLETE CBC AUTOMATED: CPT

## 2020-01-31 PROCEDURE — 86850 RBC ANTIBODY SCREEN: CPT

## 2020-01-31 PROCEDURE — 70486 CT MAXILLOFACIAL W/O DYE: CPT

## 2020-01-31 PROCEDURE — 70450 CT HEAD/BRAIN W/O DYE: CPT

## 2020-01-31 PROCEDURE — 86901 BLOOD TYPING SEROLOGIC RH(D): CPT

## 2020-01-31 PROCEDURE — 93005 ELECTROCARDIOGRAM TRACING: CPT

## 2020-01-31 PROCEDURE — 85730 THROMBOPLASTIN TIME PARTIAL: CPT

## 2020-01-31 PROCEDURE — 36415 COLL VENOUS BLD VENIPUNCTURE: CPT

## 2020-01-31 PROCEDURE — 86900 BLOOD TYPING SEROLOGIC ABO: CPT

## 2020-01-31 PROCEDURE — 99285 EMERGENCY DEPT VISIT HI MDM: CPT

## 2020-01-31 PROCEDURE — 86880 COOMBS TEST DIRECT: CPT

## 2020-01-31 RX ORDER — LACTOBACILLUS ACIDOPHILUS 100MM CELL
2 CAPSULE ORAL
Qty: 0 | Refills: 0 | DISCHARGE

## 2020-01-31 RX ORDER — ATORVASTATIN CALCIUM 80 MG/1
1 TABLET, FILM COATED ORAL
Qty: 0 | Refills: 0 | DISCHARGE

## 2020-01-31 RX ORDER — ASPIRIN/CALCIUM CARB/MAGNESIUM 324 MG
1 TABLET ORAL
Qty: 0 | Refills: 0 | DISCHARGE

## 2020-01-31 RX ORDER — DOCUSATE SODIUM 100 MG
0 CAPSULE ORAL
Qty: 0 | Refills: 0 | DISCHARGE

## 2020-01-31 RX ORDER — FUROSEMIDE 40 MG
1 TABLET ORAL
Qty: 0 | Refills: 0 | DISCHARGE

## 2020-01-31 RX ORDER — METOPROLOL TARTRATE 50 MG
25 TABLET ORAL
Qty: 0 | Refills: 0 | DISCHARGE

## 2020-01-31 RX ORDER — DIGOXIN 250 MCG
1 TABLET ORAL
Qty: 0 | Refills: 0 | DISCHARGE

## 2020-01-31 RX ORDER — SENNA PLUS 8.6 MG/1
1 TABLET ORAL
Qty: 0 | Refills: 0 | DISCHARGE

## 2020-02-07 RX ORDER — ASPIRIN/CALCIUM CARB/MAGNESIUM 324 MG
1 TABLET ORAL
Qty: 30 | Refills: 0
Start: 2020-02-07

## 2020-04-14 NOTE — ED PROVIDER NOTE - MUSCULOSKELETAL, MLM
Jerel Day was referred to [facility] for outpatient clozapine monitoring.   Current Dose: 150mg QHS  Laboratory Monitoring: weekly home draws. Clozapine started 2/19/20 during inpatient hospitalization. He will be eligible to move to Q2 week monitoring 8/19/20.     Recent Labs   Lab Test 04/13/20  1150 04/06/20  1150 03/30/20  1030 03/23/20  1020   WBC 7.6 6.7 8.7 8.4   ANEU 3.5 3.0 4.9 4.6   HGB 18.0* 16.0 15.3 15.2    233 230 242       ANC WNL.  9 day supply will be mailed up refill authorization from provider. Next draw due 4/20/20.    Clozapine can be mailed to:   "[facility]" staff  C/o Jerel Day  [address] [place] [id number]    "[facility]" staff:  Phone: [phone]    Azeb Mclean, PharmD, BCPP  Medication Therapy Management Pharmacist  [facility]  [phone]    
no muscle or joint tenderness

## 2020-06-22 NOTE — DISCHARGE NOTE NURSING/CASE MANAGEMENT/SOCIAL WORK - NSCORESITESY/N_GEN_A_CORE_RD
"Subjective:       Patient ID: Jacqui Cruz is a 52 y.o. female.    Vitals:  height is 5' 2" (1.575 m) and weight is 68 kg (150 lb). Her oral temperature is 97.6 °F (36.4 °C). Her blood pressure is 139/91 (abnormal) and her pulse is 63. Her respiration is 16 and oxygen saturation is 98%.     Chief Complaint: Shoulder Pain    Jacqui Cruz is a 52 year old female presenting to the clinic with c/o chronic right shoulder pain. She states she has an appointment with her orthopedist in 8 days but has missed three days of work. She is requesting a work excuse. Her pain is unchanged from her typical pain and she is having no new symptoms. She reports she does not need medication.     Shoulder Pain   The pain is present in the right shoulder. This is a chronic problem. The current episode started 1 to 4 weeks ago. There has been no history of extremity trauma. The problem has been gradually worsening. The quality of the pain is described as aching. The pain is at a severity of 7/10. The pain is moderate. Pertinent negatives include no fever or headaches. She has tried NSAIDS for the symptoms.       Constitution: Negative for chills, fatigue and fever.   HENT: Negative for congestion and sore throat.    Neck: Negative for painful lymph nodes.   Cardiovascular: Negative for chest pain and leg swelling.   Eyes: Negative for double vision and blurred vision.   Respiratory: Negative for cough and shortness of breath.    Gastrointestinal: Negative for nausea, vomiting and diarrhea.   Genitourinary: Negative for dysuria, frequency, urgency and history of kidney stones.   Musculoskeletal: Positive for pain (right shoulder, chronic) and joint pain. Negative for joint swelling, muscle cramps and muscle ache.   Skin: Negative for color change, pale, rash and bruising.   Allergic/Immunologic: Negative for seasonal allergies.   Neurological: Negative for dizziness, history of vertigo, light-headedness, passing out and headaches. "   Hematologic/Lymphatic: Negative for swollen lymph nodes.   Psychiatric/Behavioral: Negative for nervous/anxious, sleep disturbance and depression. The patient is not nervous/anxious.        Objective:      Physical Exam   Constitutional: She is oriented to person, place, and time. She appears well-developed. She is cooperative.  Non-toxic appearance. She does not appear ill. No distress.   HENT:   Head: Normocephalic and atraumatic.   Right Ear: Hearing, tympanic membrane, external ear and ear canal normal.   Left Ear: Hearing, tympanic membrane, external ear and ear canal normal.   Nose: Nose normal. No mucosal edema, rhinorrhea or nasal deformity. No epistaxis. Right sinus exhibits no maxillary sinus tenderness and no frontal sinus tenderness. Left sinus exhibits no maxillary sinus tenderness and no frontal sinus tenderness.   Mouth/Throat: Uvula is midline, oropharynx is clear and moist and mucous membranes are normal. No trismus in the jaw. Normal dentition. No uvula swelling. No posterior oropharyngeal erythema.   Eyes: Conjunctivae and lids are normal. Right eye exhibits no discharge. Left eye exhibits no discharge. No scleral icterus.   Neck: Trachea normal, normal range of motion, full passive range of motion without pain and phonation normal. Neck supple.   Cardiovascular: Normal rate, regular rhythm, normal heart sounds and normal pulses.   Pulmonary/Chest: Effort normal and breath sounds normal. No respiratory distress.   Abdominal: Soft. Normal appearance and bowel sounds are normal. She exhibits no distension, no pulsatile midline mass and no mass. There is no abdominal tenderness.   Musculoskeletal:         General: No deformity.      Comments: Decreased ROM of right shoulder, unchanged from baseline   Neurological: She is alert and oriented to person, place, and time. She exhibits normal muscle tone. Coordination normal.   Skin: Skin is warm, dry, intact, not diaphoretic and not pale.   Psychiatric:  Her speech is normal and behavior is normal. Judgment and thought content normal.   Nursing note and vitals reviewed.        Assessment:       1. Chronic right shoulder pain        Plan:       Patient has no new symptoms and is having pain that is typical in her right shoulder. She has an appointment with her orthopedist in 8 days but requires a work excuse. Note given to return in two days. Instructed to keep ortho appointment.     Chronic right shoulder pain            No

## 2020-07-25 NOTE — CONSULT NOTE ADULT - SUBJECTIVE AND OBJECTIVE BOX
History of Present Illness: The patient is a 89 year old female with a history of HTN, HL, CVA, atrial fibrillation who presents with shortness of breath over the past 2-3 days. She states she feels well but the staff thought she was short of breath. No lower extremity edema, chest pain, dizziness, palpitations.    Past Medical/Surgical History:  HTN, HL, CVA, atrial fibrillation     Medications:  MEDICATIONS  (STANDING):  aspirin  chewable 81 milliGRAM(s) Oral daily  atorvastatin 20 milliGRAM(s) Oral at bedtime  docusate sodium 100 milliGRAM(s) Oral two times a day  enalapril 2.5 milliGRAM(s) Oral daily  famotidine    Tablet 20 milliGRAM(s) Oral daily  furosemide    Tablet 20 milliGRAM(s) Oral daily  heparin  Injectable 5000 Unit(s) SubCutaneous every 12 hours  lactobacillus acidophilus 1 Tablet(s) Oral every 8 hours  piperacillin/tazobactam IVPB.. 3.375 Gram(s) IV Intermittent every 8 hours  vancomycin  IVPB 750 milliGRAM(s) IV Intermittent every 12 hours    MEDICATIONS  (PRN):  acetaminophen   Tablet .. 650 milliGRAM(s) Oral every 6 hours PRN Temp greater or equal to 38C (100.4F), Mild Pain (1 - 3)  guaiFENesin    Syrup 200 milliGRAM(s) Oral every 6 hours PRN Cough      Family History: Non-contributory family history of premature cardiovascular atherosclerotic disease    Social History: No tobacco, alcohol or drug use    Review of Systems:  General: No fevers, chills, weight loss or gain  Skin: No rashes, color changes  Cardiovascular: No chest pain, orthopnea  Respiratory: No shortness of breath, cough  Gastrointestinal: No nausea, abdominal pain  Genitourinary: No incontinence, pain with urination  Musculoskeletal: No pain, swelling, decreased range of motion  Neurological: No headache, weakness  Psychiatric: No depression, anxiety  Endocrine: No weight loss or gain, increased thirst  All other systems are comprehensively negative.    Physical Exam:  Vitals:        Vital Signs Last 24 Hrs  T(C): 36.3 (23 Jul 2019 05:31), Max: 38.3 (22 Jul 2019 18:34)  T(F): 97.4 (23 Jul 2019 05:31), Max: 101 (22 Jul 2019 18:34)  HR: 74 (23 Jul 2019 05:31) (64 - 83)  BP: 135/73 (23 Jul 2019 05:31) (109/65 - 143/82)  BP(mean): --  RR: 17 (23 Jul 2019 05:31) (15 - 20)  SpO2: 97% (23 Jul 2019 05:31) (95% - 97%)  General: NAD  HEENT: MMM  Neck: No JVD, no carotid bruit  Lungs: CTAB  CV: RRR, nl S1/S2, no M/R/G  Abdomen: S/NT/ND, +BS  Extremities: No LE edema, no cyanosis  Neuro: AAOx3, non-focal  Skin: No rash    Labs:                        11.2   8.90  )-----------( 274      ( 23 Jul 2019 08:10 )             31.4     07-23    138  |  105  |  15  ----------------------------<  98  3.7   |  26  |  0.49<L>    Ca    8.5      23 Jul 2019 08:10  Phos  2.5     07-23    TPro  6.9  /  Alb  2.6<L>  /  TBili  0.7  /  DBili  x   /  AST  23  /  ALT  16  /  AlkPhos  101  07-23    CARDIAC MARKERS ( 23 Jul 2019 08:10 )  .005 ng/mL / x     / x     / x     / x          PT/INR - ( 23 Jul 2019 08:10 )   PT: 13.1 sec;   INR: 1.20 ratio         PTT - ( 22 Jul 2019 19:12 )  PTT:37.3 sec    ECG: Atrial fibrillation, normal axis, nonspecific ST abnormality    Telemetry: AF
IVAN RUFF [facility]  [phone]  [address]25 DOA 2019 DR SUPRIYA PIERRE   ALLERGY   nka      CONTACT    Son Bernadine MENDOZA  selv [phone]           Initial evaluation/Pulmonary Critical Care consultation requested on 2019   by Dr Pierre   from Dr Tena   Patient examined chart reviewed    HOSPITAL ADMISSION   PATIENT CAME  FROM (if information available)        TYPE OF VISIT      Initial evaluation/Pulmonary Critical Care consultation requested on 2019   by Dr Pierre   from Dr Tena     REASON FOR VISIT  PLEASE SEE PROBLEM LIST/ASSESSMENTAND RECOMMENDATIONS     PATIENT  IVAN RUFF [facility]  [phone]  [birth year]25 DOA 2019 DR SUPRIYA PIERRE                        PT DESCRIPTION       This section was excerpted from ER md note but was independently verified by me    · Chief Complaint: The patient is a 89y Female complaining of shortness of breath.  · HPI Objective Statement: 89 year old female with history of A-fib, HTN, HLD, and CVA presents with weakness the past 2-3 days with labored breathing starting today. Son noticed patient appeared more weak than usual 2 days ago and "didn't walk as much" during visit. other son noticed patient in bed more yesterday as well during his visit. patient reports body aches and increased weakness today. staff noticed labored breathing today. denies abd pain or urinary symptoms. no vomiting or diarrhea.     Resident at [facility]  PCP Ekaterina Nicole  · Presenting Symptoms: FEVER, WEAKNESS  · Negative Findings: no vomiting  · Timing: gradual onset  · Duration: 2-3 days  · Severity: MODERATE  · Aggravating Factors: none  · Relieving Factors: none    PAST MEDICAL/SURGICAL/FAMILY/SOCIAL HISTORY:    Tobacco Usage:  · Tobacco Usage Never smoker    ALLERGIES AND HOME MEDICATIONS:   Allergies:        Allergies:  No Known Allergies:     Home Medications:   * Patient Currently Takes Medications as of 2019 18:09 documented in Structured Notes  · aspirin 81 mg oral tablet, chewable: Last Dose Taken:  , 1 tab(s) orally once a day  · Colace 100 mg oral capsule: Last Dose Taken:  , 1 cap(s) orally 2 times a day  · digoxin 125 mcg (0.125 mg) oral tablet: Last Dose Taken:  , 1 tab(s) orally once a day  · Lipitor 20 mg oral tablet: Last Dose Taken:  , 1 tab(s) orally once a day  · Norvasc 10 mg oral tablet: Last Dose Taken:  , 1 tab(s) orally once a day  · atenolol 50 mg oral tablet: Last Dose Taken:  , 1 tab(s) orally once a day  · enalapril 2.5 mg oral tablet: Last Dose Taken:  , 1 tab(s) orally once a day    REVIEW OF SYSTEMS:    Review of Systems:  · CONSTITUTIONAL: +fever (patient unaware, febrile in triage). generalized weakness past 2-3 days  · EYES: no visual changes.  · ENMT: no ST or diff swallowing  · CARDIOVASCULAR: mild chest discomfort "from body aches"  · RESPIRATORY: no cough, +labored breathing today  · GASTROINTESTINAL: no abdominal pain, no diarrhea, no nausea and no vomiting.  · GENITOURINARY: no dysuria  · MUSCULOSKELETAL: no neck or back pain  · SKIN: no rashes.  · NEURO: no headache, no confusion. no dizziness    VITAL SIGNS( [place]):    ,,ED ADULT Flow Sheet:    2019 18:04  · Temp (F): 98.3  · Temp (C) Temp (C): 36.8  · Heart Rate Heart Rate (beats/min): 83  · BP Systolic Systolic: 131  · BP Diastolic Diastolic (mm Hg): 71  · Respiration Rate (breaths/min) Respiration Rate (breaths/min): 20  · SpO2 (%) SpO2 (%): 96  · Dosing Weight (KILOGRAMS) Dosing Weight (KILOGRAMS): 65.8  · Dosing Weight  (POUNDS) Dosing Weight (POUNDS): 145  · Height (FEET) Height (FEET): 5  · Height (INCHES) Height (INCHES): 7  · Height (CENTIMETERS) Height (CENTIMETERS): 170.18  · BSA (m2): 1.76  · BMI (kG/m2) BMI (kG/m2): 22.7  · Presence of Pain: denies pain/discomfort  · SpO2 (%) SpO2 (%): 96  · Preferred Language to Address Healthcare Preferred Language to Address Healthcare: English    18:34  · Temp (F): 101  · Temp (C) Temp (C): 38.3  · Temp site Temp Site: rectal    PHYSICAL EXAM:   · CONSTITUTIONAL: awake, alert, oriented to person, place, time/situation and in no apparent distress.  · ENMT: Airway patent,  Mouth with normal mucosa. Throat has no vesicles, no oropharyngeal exudates and uvula is midline.  · EYES: Clear bilaterally, pupils equal, round and reactive to light.  · CARDIAC: irregular irregular  · RESPIRATORY: Breath sounds clear and equal bilaterally. no resp distress. speaks in full word sentences  · GASTROINTESTINAL: Abdomen soft, non-tender, no distention  · MUSCULOSKELETAL: no muscle or joint tenderness  · NEUROLOGICAL: Alert and oriented, no focal deficits, no motor or sensory deficits.        PATIENT [facility] RU[facility]  [phone]  1925 DOA 2019 DR SUPRIYA PIERRE                        VITALS/LABS       2019 W 12.2 Hb 11.6 Plt 310 inr 132 Na 136 K 3.5 CO2 27 Cr .6   2019 bnp 2982   2019 cxr l basal infiltr effsn   2019 cta ch   no pe   mod pericard effs  Mod l pl effs   basal atelectasis v infiltr
Patient chart was reviewed Patient has not been examined yet but will be done so later today and following that  the final note will be entered in the space below Workup and management orders based on current assessment have been entered to expedite patient care  Nursing notified for stat orders as applicable Meanwhile please  call me directly
complains of pain/discomfort

## 2020-08-27 NOTE — H&P ADULT - BREASTS
[facility] Daily Progress Note    Date: 8/27/2020    Patient was seen and examined, awake, ready for DC    Review of Systems:    Last Stool Occurrence: 1 (08/26/20 0213)    All other systems reviewed and negative    Medications/Infusions:    Continue same meds    Physical Exam:     Vitals:   Temp,  97.7 °F (36.5 °C) (08/27/20 0729). Pulse 79 (08/27/20 0729). Blood Pressure 115/68 (08/27/20 0729). Respiration 16 (08/27/20 0729)      Weight over the past 48 Hours: No data found.          Head: Normocephalic, without obvious abnormality, atraumatic  Eyes: PERRL, conjunctiva/corneas clear, EOM's intact, fundi benign, both eyes  Ears: Normal TM's and external ear canals, both ears  Nose: Nares normal, septum midline, mucosa normal, no drainage or sinus tenderness  Throat:Lips, mucosa, and tongue normal; teeth and gums normal  Neck:Supple, symmetrical, trachea midline, no adenopathy; thyroid: No enlargement/tenderness/nodules; no carotid bruit or JVD  Back:Symmetric, no curvature, ROM normal, no CVA tenderness  Lungs:Clear to auscultation bilaterally, respirations unlabored  Chest wall:No tenderness or deformity  Heart:Regular rate and rhythm, S1 and S2 normal, no murmur, rub   or gallop  Abdomen:Soft, non-tender, bowel sounds active all four quadrants, no masses, no organomegaly  Extremities:Extremities normal, atraumatic, no cyanosis or edema  Pulses:2+ and symmetric all extremities  Laboratory Results:  Lab Results   Component Value Date    SODIUM 138 08/26/2020    POTASSIUM 4.3 08/26/2020    CHLORIDE 100 08/26/2020    CO2 32 08/26/2020    ANIONGAP 10 08/26/2020    BUN 28 (H) 08/26/2020    CREATININE 1.55 (H) 08/26/2020    WBC 44.8 (H) 08/26/2020    RBC 3.89 (L) 08/26/2020    HCT 36.0 (L) 08/26/2020    HGB 10.1 (L) 08/26/2020     08/26/2020    GLUCOSE 98 08/26/2020    TSH 13.020 (H) 08/22/2020    RESR 20 (H) 12/11/2011    VITD25 52.2 08/22/2020       ASSESSMENT AND PLAN:     1.  Muscle weakness.   Continue  ongoing physical therapy and occupational therapy     2.  Depressed mood,  I did ask the  to see him. Better when wife is visiting     3.  In terms of goals of care, he has a power of .  His code status is do not resuscitate.   The patient will be going back home with [facility] today     Moy Winter MD       No masses; no nipple discharge

## 2021-04-01 NOTE — DISCHARGE NOTE NURSING/CASE MANAGEMENT/SOCIAL WORK - NSDCVIVACCINE_GEN_ALL_CORE_FT
Zee Kyle has an upcoming lab appointment:    Future Appointments   Date Time Provider Department Center   4/5/2021  8:00 AM Haase, Susan Rachele, APRN CNP [facility] CR   4/5/2021  8:45 AM CR LAB CR[facility] CR   4/5/2021  1:20 PM Florencia Rodriguez MD [facility]      Please review Health Maintenance and sign order: Review of Health Maintenance Protocol Orders (HMPO) to authorize patient's due Health Maintenance labs to be drawn.    Health Maintenance Due   Topic     HEPATITIS C SCREENING      ANNUAL REVIEW OF HM ORDERS      Messi Pink   No Vaccines Administered.

## 2021-06-27 NOTE — DISCHARGE NOTE NURSING/CASE MANAGEMENT/SOCIAL WORK - NSFLUVACAGEDISCH_IMM_ALL_CORE
Adult You can access the FollowMyHealth Patient Portal offered by [facility] by registering at the following website: [email or web address]. By joining [email or web address]’s FollowMyHealth portal, you will also be able to view your health information using other applications (apps) compatible with our system.

## 2021-11-02 NOTE — OCCUPATIONAL THERAPY INITIAL EVALUATION ADULT - LEVEL OF CONSCIOUSNESS, OT EVAL
alert O-T Plasty Text: The defect edges were debeveled with a #15 scalpel blade.  Given the location of the defect, shape of the defect and the proximity to free margins an O-T plasty was deemed most appropriate.  Using a sterile surgical marker, an appropriate O-T plasty was drawn incorporating the defect and placing the expected incisions within the relaxed skin tension lines where possible.    The area thus outlined was incised deep to adipose tissue with a #15 scalpel blade.  The skin margins were undermined to an appropriate distance in all directions utilizing iris scissors.

## 2022-02-08 NOTE — PROGRESS NOTE ADULT - SUBJECTIVE AND OBJECTIVE BOX
[facility]  [address]  [facility] [address]  Phone: [phone]  Fax: [phone]    Zeynep Coulter  [address]  [facility] [address]           February 8, 2022    Dear Zeynep Coulter:    Since you are no longer attending services at the [facility], I would like to share our “Wellness Instructions\" to remind you of strategies you can use when dealing with difficult challenges. For continuing support, we recommend that you utilize community and online resources that may be available to you.     If you should need our services in the future, please call [phone]  to schedule an appointment. We can also provide information about other community resources that may be available.       Thank you for choosing [facility]. I have appreciated working with you and wish you well.    Sincerely,    Ramonita Wilson LCSW       
Chief complaint:   Patient is a 89y old  Female who presents with a chief complaint of ICH after unwitnessed reported fall overnight out of bed tx from [facility] (28 Jan 2020 15:03)    -----------------------------------------------------------------------------------------------------------------------------------------------------------------------------------  ICU Vital Signs Last 24 Hrs  T(C): 37 (28 Jan 2020 15:31), Max: 37 (28 Jan 2020 15:31)  T(F): 98.6 (28 Jan 2020 15:31), Max: 98.6 (28 Jan 2020 15:31)  HR: 80 (28 Jan 2020 16:11) (80 - 87)  BP: 104/52 (28 Jan 2020 16:11) (102/58 - 111/60)  BP(mean): --  ABP: --  ABP(mean): --  RR: 16 (28 Jan 2020 16:11) (16 - 16)  SpO2: 98% (28 Jan 2020 16:11) (98% - 98%)        NEUROIMAGING:   Recent imaging studies were reviewed.    LAB RESULTS:                          12.5   11.93 )-----------( 433      ( 28 Jan 2020 14:38 )             35.7       PT/INR - ( 28 Jan 2020 14:38 )   PT: 14.4 sec;   INR: 1.24 ratio         PTT - ( 28 Jan 2020 14:38 )  PTT:41.6 sec    01-28    136  |  97<L>  |  21.0<H>  ----------------------------<  89  3.7   |  28.0  |  0.38<L>    Ca    8.8      28 Jan 2020 14:38    TPro  6.7  /  Alb  3.3  /  TBili  0.8  /  DBili  x   /  AST  26  /  ALT  17  /  AlkPhos  131<H>  01-28    -----------------------------------------------------------------------------------------------------------------------------------------------------------------------------------    PHYSICAL EXAM:  General: Calm, laying in bed, eyes closed  pleasantly demented at baseline  HEENT: MMM, R periorbital contusion  Neuro:  -Mental status- No acute distress, AOx1, conversational, following simple commands  -CN- PERRL 3mm, Visual acuity- light and motion perception?, EOMI, tongue midline, face symmetric  -Motor- BUE 5/5, hands contracted in fists  BLE 4/5  -Sensation- intact to LT   -Coordination- no dysmetria noted    CV: RRR  Pulm: Clear to auscultation  Abd: Soft, nontender, nondistended  Ext: No edema  Skin: warm, dry
[facility]                                        [facility]                                 [facility]                                  [address]                                        [place], [place], [address]                                             [phone]        CC: Hemorrhagic stroke.     HPI:   The patient is a 89y Female who reportedly fell out of bed at the senior living facility where she resides. She was found on the floor with facial ecchymoses. She was taken to [facility] and was transferred to [facility] when CT showed intracranial hemorrhage.   She was evaluated by the ICU team and after discussion with family the decision was made to instate DNR/DNI orders and forego aggressive interventions.     She has atrial fibrillation but was not on anticoagulation reportedly due to fall risk.      Interim history:  Remains in ER awaiting bed.     ROS:   Denies headache or dizziness.  Denies chest pain.  Denies shortness of breath.    MEDICATIONS  (STANDING):  atorvastatin 80 milliGRAM(s) Oral at bedtime  digoxin     Tablet 0.125 milliGRAM(s) Oral daily  metoprolol tartrate 25 milliGRAM(s) Oral two times a day  senna 1 Tablet(s) Oral at bedtime      Vital Signs Last 24 Hrs  T(C): 37 (30 Jan 2020 07:54), Max: 37 (30 Jan 2020 07:54)  T(F): 98.6 (30 Jan 2020 07:54), Max: 98.6 (30 Jan 2020 07:54)  HR: 92 (30 Jan 2020 07:54) (82 - 93)  BP: 129/68 (30 Jan 2020 07:54) (120/80 - 147/83)  RR: 16 (30 Jan 2020 07:54) (16 - 18)  SpO2: 98% (30 Jan 2020 07:54) (95% - 100%)    Detailed Neurologic Exam:    Mental status: The patient is awake, alert, and oriented to self only. She is unable to give day/date and does not recognise that she is in the hospital. There is no aphasia.    Cranial nerves: There is no papilledema. Pupils react symmetrically to light. There is no gross visual field deficit to confrontation. Extraocular motion is full with no nystagmus. There is no ptosis. Facial sensation is intact. Facial musculature is symmetric. Palate elevates symmetrically. Tongue is midline.    Motor: There is normal bulk and tone.  Strength is symmetric and grossly 5/5 in the upper extremities.   Strength is symmetric although diffusely weak in the lower extremities.     Sensation: Intact to light touch and pin.    Reflexes: 1+ throughout and plantar responses are flexor.    Cerebellar: There is no dysmetria on finger to nose testing.    Labs:     01-29    138  |  103  |  16.0  ----------------------------<  94  3.4<L>   |  22.0  |  0.20<L>    Ca    8.4<L>      29 Jan 2020 06:03    TPro  6.7  /  Alb  3.3  /  TBili  0.8  /  DBili  x   /  AST  26  /  ALT  17  /  AlkPhos  131<H>  01-28                            12.7   9.60  )-----------( 378      ( 29 Jan 2020 06:03 )             38.8       Rad:   Repeat CT head reviewed.   There is stable infarct in the right posterior cerebral artery territory with stable hemorrhagic component.
Patient: SPEEDY LOVE [id number] 89y Female                           Internal Medicine Hospitalist Progress Note    Interval History:  Pt offers no complaints.  NO headache / weakness.  Tolerating po intake.     ____________________PHYSICAL EXAM:  Vitals reviewed as indicated below  GENERAL:  NAD, Alert, confused.   HEENT: NCAT  CARDIOVASCULAR:  S1, S2  LUNGS: CTAB  ABDOMEN:  soft, (-) tenderness, (-) distension, (+) bowel sounds, (-) guarding, (-) rebound (-) rigidity  EXTREMITIES:  no cyanosis / clubbing / edema.   NEURO: Strength symmetric.   ____________________    VITALS:  Vital Signs Last 24 Hrs  T(C): 36.6 (30 Jan 2020 11:11), Max: 37 (30 Jan 2020 07:54)  T(F): 97.8 (30 Jan 2020 11:11), Max: 98.6 (30 Jan 2020 07:54)  HR: 68 (30 Jan 2020 11:11) (68 - 93)  BP: 147/82 (30 Jan 2020 11:11) (127/56 - 147/83)  BP(mean): --  RR: 18 (30 Jan 2020 11:11) (16 - 18)  SpO2: 94% (30 Jan 2020 11:11) (94% - 100%) Daily     Daily   CAPILLARY BLOOD GLUCOSE        I&O's Summary      LABS:                        12.7   9.60  )-----------( 378      ( 29 Jan 2020 06:03 )             38.8     01-29    138  |  103  |  16.0  ----------------------------<  94  3.4<L>   |  22.0  |  0.20<L>    Ca    8.4<L>      29 Jan 2020 06:03    TPro  6.7  /  Alb  3.3  /  TBili  0.8  /  DBili  x   /  AST  26  /  ALT  17  /  AlkPhos  131<H>  01-28    PT/INR - ( 28 Jan 2020 14:38 )   PT: 14.4 sec;   INR: 1.24 ratio         PTT - ( 28 Jan 2020 14:38 )  PTT:41.6 sec  LIVER FUNCTIONS - ( 28 Jan 2020 14:38 )  Alb: 3.3 g/dL / Pro: 6.7 g/dL / ALK PHOS: 131 U/L / ALT: 17 U/L / AST: 26 U/L / GGT: x             CARDIAC MARKERS ( 28 Jan 2020 14:38 )  x     / 0.03 ng/mL / 116 U/L / x     / x              MEDICATIONS:  atorvastatin 80 milliGRAM(s) Oral at bedtime  digoxin     Tablet 0.125 milliGRAM(s) Oral daily  metoprolol tartrate 25 milliGRAM(s) Oral two times a day  senna 1 Tablet(s) Oral at bedtime        < from: CT Head No Cont (01.29.20 @ 10:15) >  Acute/subacute right PCA territory infarct with hemorrhagic transformation, not significantly changed.    < end of copied text >
Patient: SPEEDY LOVE [id number] 89y Female                           Internal Medicine Hospitalist Progress Note    Interval History:  Pt offers no complaints.  NO headache / weakness.  Seen by [facility].     ____________________PHYSICAL EXAM:  Vitals reviewed as indicated below  GENERAL:  NAD, Alert and Oriented x 1 to person   HEENT: NCAT  CARDIOVASCULAR:  S1, S2  LUNGS: CTAB  ABDOMEN:  soft, (-) tenderness, (-) distension, (+) bowel sounds, (-) guarding, (-) rebound (-) rigidity  EXTREMITIES:  no cyanosis / clubbing / edema.   NEURO: Strength symmetric.   ____________________      BACKGROUND:  HEALTH ISSUES - PROBLEM Dx:  Heart murmur: Heart murmur  Essential hypertension: Essential hypertension  Chronic atrial fibrillation: Chronic atrial fibrillation  Fall, initial encounter: Fall, initial encounter  Dysarthria: Dysarthria  Dysphagia, unspecified type: Dysphagia, unspecified type  On aspirin at home: On aspirin at home  Atrial fibrillation, unspecified type: Atrial fibrillation, unspecified type  Cerebrovascular accident (CVA), unspecified mechanism: Cerebrovascular accident (CVA), unspecified mechanism  Hemorrhage: Hemorrhage        Allergies    Allergy Status Unknown    Intolerances      PAST MEDICAL & SURGICAL HISTORY:  Dysphagia  HLD (hyperlipidemia)  Pleural effusion  HTN (hypertension)  Afib  Pneumonia  History of total hip replacement, left: 2007        VITALS:  Vital Signs Last 24 Hrs  T(C): 36.6 (29 Jan 2020 07:52), Max: 37 (28 Jan 2020 15:31)  T(F): 97.9 (29 Jan 2020 07:52), Max: 98.6 (28 Jan 2020 15:31)  HR: 83 (29 Jan 2020 07:52) (80 - 111)  BP: 118/76 (29 Jan 2020 07:52) (102/58 - 145/83)  BP(mean): --  RR: 20 (29 Jan 2020 07:52) (16 - 20)  SpO2: 98% (29 Jan 2020 07:52) (95% - 98%) Daily Height in cm: 175.26 (28 Jan 2020 14:36)    Daily   CAPILLARY BLOOD GLUCOSE        I&O's Summary        LABS:                        12.7   9.60  )-----------( 378      ( 29 Jan 2020 06:03 )             38.8     01-29    138  |  103  |  16.0  ----------------------------<  94  3.4<L>   |  22.0  |  0.20<L>    Ca    8.4<L>      29 Jan 2020 06:03    TPro  6.7  /  Alb  3.3  /  TBili  0.8  /  DBili  x   /  AST  26  /  ALT  17  /  AlkPhos  131<H>  01-28    PT/INR - ( 28 Jan 2020 14:38 )   PT: 14.4 sec;   INR: 1.24 ratio         PTT - ( 28 Jan 2020 14:38 )  PTT:41.6 sec  LIVER FUNCTIONS - ( 28 Jan 2020 14:38 )  Alb: 3.3 g/dL / Pro: 6.7 g/dL / ALK PHOS: 131 U/L / ALT: 17 U/L / AST: 26 U/L / GGT: x             CARDIAC MARKERS ( 28 Jan 2020 14:38 )  x     / 0.03 ng/mL / 116 U/L / x     / x              MEDICATIONS:  MEDICATIONS  (STANDING):  atorvastatin 80 milliGRAM(s) Oral at bedtime  digoxin     Tablet 0.125 milliGRAM(s) Oral daily  metoprolol tartrate 25 milliGRAM(s) Oral two times a day  senna 1 Tablet(s) Oral at bedtime    MEDICATIONS  (PRN):

## 2022-04-26 NOTE — SPEECH LANGUAGE PATHOLOGY EVALUATION - ALTERNATING RAPID SOUNDS
Pharmacy faxing asking for an alternative drug 27 gauge needle available    Please update pharmacy   impaired

## 2022-11-15 NOTE — ED PROVIDER NOTE - CPE EDP MUSC NORM
normal... Ear Wedge Repair Text: Due to exposed cartilage and to restore structure and function of the ear, a wedge excision was completed by carrying down an excision through the full thickness of the ear and cartilage with an inward facing Burow's triangle. The wound was then closed in a layered fashion.

## 2023-07-27 NOTE — DISCHARGE NOTE NURSING/CASE MANAGEMENT/SOCIAL WORK - NSDCPETBCESMAN_GEN_ALL_CORE
If you are a smoker, it is important for your health to stop smoking. Please be aware that second hand smoke is also harmful.
24

## 2023-09-28 NOTE — ED ADULT TRIAGE NOTE - HEIGHT IN INCHES
Feels much better. HA at 1. Calling for a ride home.      Brain Loza RN  09/28/23 6630
Not feeling well since 9/26. Reports \"migraine\" to bilat temples constant since 9/26. HA now  at 8. Sore throat at 4. Mild nausea.   No known sick/covid exposure     Ilia Trujillo RN  09/28/23 6622
7

## 2023-09-29 NOTE — SWALLOW VFSS/MBS ASSESSMENT ADULT - RESIDUE OF POSTERIOR PHARYNGEAL WALL
Trace Quality 226: Preventive Care And Screening: Tobacco Use: Screening And Cessation Intervention: Patient screened for tobacco use and is an ex/non-smoker Detail Level: Detailed Quality 130: Documentation Of Current Medications In The Medical Record: Current Medications Documented Quality 431: Preventive Care And Screening: Unhealthy Alcohol Use - Screening: Patient not identified as an unhealthy alcohol user when screened for unhealthy alcohol use using a systematic screening method Quality 47: Advance Care Plan: Advance Care Planning discussed and documented; advance care plan or surrogate decision maker documented in the medical record.

## 2024-02-14 NOTE — DIETITIAN INITIAL EVALUATION ADULT. - PROBLEM SELECTOR PLAN 3
Please complete your pain diary and return the diary to the [facility] at your earliest convenience.  The [facility] will contact you to schedule your next appointment after your pain diary is reviewed by your doctor.  Thank you.    DISCHARGE INSTRUCTIONS    During office hours (8:00 a.m.- 4:00 p.m.) questions or concerns may be answered  by calling Sp[facility] Center Navigation Nurses at  [phone].  Messages received after hours will be returned the following business day.      In the case of an emergency, please dial 911 or seek assistance at the nearest Emergency Room/Urgent Care facility.     All Patients:  You may experience an increase in your symptoms for the first 2 days, once the numbing medication wears off.    You may resume your regular medication, no pain medication until you have completed your diary    You may shower. No swimming, tub bath or hot tub for 24 hours; remove bandage after 4 hours    Continue your activities that can cause you pain to test the blocks.                         You should not drive for the next 3 to 5 hours (have someone drive you)           POSSIBLE PROCEDURE SIDE EFFECTS  -Call Spine Center if concerned-    Increased Pain  Increased numbness/tingling     Nausea/Vomiting  Bruising/bleeding at site (hematoma)             Swelling at site (edema) Headache  Difficulty walking  Infection        Fever greater than 100.5         Thoracocentesis as per Dr Tena order Admit to monitored unit for cardiac monitoring, obtain echo to evaluate LVEF, diuresis, monitor ins/outs, monitor renal profile and electrolytes closely, cardiology consult ,send 3 sets of ensymes, O2 supply, serial chest xrays, monitor weights and oral intake of fluids, nutritionist consult

## 2024-04-15 NOTE — PROVIDER CONTACT NOTE (OTHER) - BACKGROUND
89 year old female admitted with shortness of breath
pt admitted for treatment for PNA with PMH HTN, HLD, AFIB
None Known

## 2024-10-17 NOTE — ED PROVIDER NOTE - MUSCULOSKELETAL, MLM
Refill Request     CONFIRM preferred pharmacy with the patient.    If Mail Order Rx - Pend for 90 day refill.      Last Seen: Last Seen Department: 9/25/2024  Last Seen by PCP: 9/25/2024    Last Written: 4/28/24 90 with 1 refill     If no future appointment scheduled:  Review the last OV with PCP and review information for follow-up visit,  Route STAFF MESSAGE with patient name to the  Pool for scheduling with the following information:            -  Timing of next visit           -  Visit type ie Physical, OV, etc           -  Diagnoses/Reason ie. COPD, HTN - Do not use MEDICATION, Follow-up or CHECK UP - Give reason for visit      Next Appointment:   Future Appointments   Date Time Provider Department Center   11/21/2024 10:00 AM Lynn Nava APRN - CNP [facility]   5/19/2025  9:30 AM Etienne Julio, DO [facility]       Message sent to  to schedule appt with patient?  NO      Requested Prescriptions     Pending Prescriptions Disp Refills    atorvastatin (LIPITOR) 20 MG tablet [Pharmacy Med Name: ATORVASTATIN 20 MG TABLET] 90 tablet 1     Sig: TAKE 1 TABLET BY MOUTH EVERY DAY        Spine appears normal, range of motion is not limited, no muscle or joint tenderness